# Patient Record
Sex: MALE | Race: WHITE | NOT HISPANIC OR LATINO | Employment: OTHER | URBAN - METROPOLITAN AREA
[De-identification: names, ages, dates, MRNs, and addresses within clinical notes are randomized per-mention and may not be internally consistent; named-entity substitution may affect disease eponyms.]

---

## 2023-09-19 PROBLEM — M51.9 DISORDER OF INTERVERTEBRAL DISC OF THORACIC SPINE: Status: ACTIVE | Noted: 2023-09-19

## 2023-09-19 PROBLEM — F41.9 ANXIETY: Status: ACTIVE | Noted: 2023-09-19

## 2023-09-19 PROBLEM — M47.812 CERVICAL ARTHRITIS: Status: ACTIVE | Noted: 2023-09-19

## 2023-09-19 PROBLEM — M54.12 CERVICAL RADICULOPATHY: Status: ACTIVE | Noted: 2023-09-19

## 2023-09-19 PROBLEM — G47.00 INSOMNIA: Status: ACTIVE | Noted: 2023-09-19

## 2023-09-19 PROBLEM — R22.9 SUBCUTANEOUS MASS: Status: ACTIVE | Noted: 2023-09-19

## 2023-09-19 PROBLEM — F11.20 OPIATE DEPENDENCE (MULTI): Status: ACTIVE | Noted: 2023-09-19

## 2023-09-19 PROBLEM — M77.50 ANKLE ENTHESOPATHY: Status: ACTIVE | Noted: 2023-09-19

## 2023-09-19 PROBLEM — M48.00 SPINAL STENOSIS: Status: ACTIVE | Noted: 2023-09-19

## 2023-09-19 PROBLEM — L40.9 PSORIASIS: Status: ACTIVE | Noted: 2023-09-19

## 2023-09-19 PROBLEM — M77.52 LEFT CALCANEAL BURSITIS: Status: ACTIVE | Noted: 2023-09-19

## 2023-09-19 PROBLEM — E78.5 HYPERLIPIDEMIA: Status: ACTIVE | Noted: 2023-09-19

## 2023-09-19 PROBLEM — Z91.89 AT RISK FOR BLEEDING: Status: ACTIVE | Noted: 2023-09-19

## 2023-09-19 PROBLEM — N40.0 BENIGN PROSTATIC HYPERPLASIA WITHOUT URINARY OBSTRUCTION: Status: ACTIVE | Noted: 2023-09-19

## 2023-09-19 PROBLEM — R63.5 ABNORMAL WEIGHT GAIN: Status: ACTIVE | Noted: 2023-09-19

## 2023-09-19 PROBLEM — S88.119A AMPUTATED BELOW KNEE (MULTI): Status: ACTIVE | Noted: 2023-09-19

## 2023-09-19 PROBLEM — E11.40 PAINFUL DIABETIC NEUROPATHY (MULTI): Status: ACTIVE | Noted: 2023-09-19

## 2023-09-19 PROBLEM — M67.972 ACHILLES TENDON DISORDER, LEFT: Status: ACTIVE | Noted: 2023-09-19

## 2023-09-19 PROBLEM — I74.3: Status: ACTIVE | Noted: 2023-09-19

## 2023-09-19 PROBLEM — M62.838 MUSCLE SPASM: Status: ACTIVE | Noted: 2023-09-19

## 2023-09-19 PROBLEM — J45.990 ASTHMA, EXERCISE INDUCED (HHS-HCC): Status: ACTIVE | Noted: 2023-09-19

## 2023-09-19 PROBLEM — Z89.9: Status: ACTIVE | Noted: 2023-09-19

## 2023-09-19 PROBLEM — G56.20 ULNAR NEUROPATHY AT ELBOW: Status: ACTIVE | Noted: 2023-09-19

## 2023-09-19 PROBLEM — M51.24 THORACIC DISC HERNIATION: Status: ACTIVE | Noted: 2023-09-19

## 2023-09-19 PROBLEM — L85.9 EPIDERMAL THICKENING, UNSPECIFIED: Status: ACTIVE | Noted: 2017-05-17

## 2023-09-19 PROBLEM — N52.9 ERECTILE DYSFUNCTION: Status: ACTIVE | Noted: 2023-09-19

## 2023-09-19 PROBLEM — R93.89 ABNORMAL FINDING ON IMAGING: Status: ACTIVE | Noted: 2023-09-19

## 2023-09-19 PROBLEM — R00.2 HEART PALPITATIONS: Status: ACTIVE | Noted: 2023-09-19

## 2023-09-19 PROBLEM — G47.33 OBSTRUCTIVE SLEEP APNEA SYNDROME: Status: ACTIVE | Noted: 2023-09-19

## 2023-09-19 PROBLEM — K04.7 DENTAL INFECTION: Status: ACTIVE | Noted: 2023-09-19

## 2023-09-19 PROBLEM — M96.1 CERVICAL POSTLAMINECTOMY SYNDROME: Status: ACTIVE | Noted: 2023-09-19

## 2023-09-19 PROBLEM — D17.30 LIPOMA OF SKIN AND SUBCUTANEOUS TISSUE: Status: ACTIVE | Noted: 2023-09-19

## 2023-09-19 PROBLEM — G89.4 CHRONIC PAIN SYNDROME: Status: ACTIVE | Noted: 2023-09-19

## 2023-09-19 PROBLEM — G56.01 RIGHT CARPAL TUNNEL SYNDROME: Status: ACTIVE | Noted: 2023-09-19

## 2023-09-19 PROBLEM — I96 GANGRENE OF FOOT (MULTI): Status: ACTIVE | Noted: 2023-09-19

## 2023-09-19 PROBLEM — F32.9 MAJOR DEPRESSIVE DISORDER, SINGLE EPISODE, UNSPECIFIED: Status: ACTIVE | Noted: 2023-09-19

## 2023-09-19 PROBLEM — I10 HYPERTENSION: Status: ACTIVE | Noted: 2023-09-19

## 2023-09-19 PROBLEM — R79.9 ABNORMAL BLOOD CHEMISTRY: Status: ACTIVE | Noted: 2023-09-19

## 2023-09-19 PROBLEM — M19.90 ARTHRITIS: Status: ACTIVE | Noted: 2023-09-19

## 2023-09-19 PROBLEM — R60.9 EDEMA: Status: ACTIVE | Noted: 2023-09-19

## 2023-09-19 PROBLEM — E11.9 DIABETES MELLITUS (MULTI): Status: ACTIVE | Noted: 2023-09-19

## 2023-09-19 PROBLEM — K21.00 GASTROESOPHAGEAL REFLUX DISEASE WITH ESOPHAGITIS: Status: ACTIVE | Noted: 2023-09-19

## 2023-09-19 PROBLEM — M54.14 THORACIC RADICULOPATHY: Status: ACTIVE | Noted: 2023-09-19

## 2023-09-19 PROBLEM — G45.9 TRANSIENT ISCHEMIC ATTACK: Status: ACTIVE | Noted: 2023-09-19

## 2023-09-19 PROBLEM — I87.2 CHRONIC VENOUS INSUFFICIENCY: Status: ACTIVE | Noted: 2017-05-17

## 2023-09-19 PROBLEM — M43.22 FUSION OF SPINE OF CERVICAL REGION: Status: ACTIVE | Noted: 2023-09-19

## 2023-09-19 PROBLEM — F44.9 DISSOCIATIVE DISORDER: Status: ACTIVE | Noted: 2023-09-19

## 2023-09-19 PROBLEM — M25.839 ULNAR IMPINGEMENT SYNDROME: Status: ACTIVE | Noted: 2023-09-19

## 2023-09-19 PROBLEM — R06.89 IMPAIRED GAS EXCHANGE: Status: ACTIVE | Noted: 2023-09-19

## 2023-09-19 PROBLEM — D48.2: Status: ACTIVE | Noted: 2023-09-19

## 2023-09-19 PROBLEM — M47.817 SPONDYLOSIS WITHOUT MYELOPATHY OR RADICULOPATHY, LUMBOSACRAL REGION: Status: ACTIVE | Noted: 2023-09-19

## 2023-09-19 PROBLEM — I82.90 VENOUS THROMBOSIS: Status: ACTIVE | Noted: 2023-09-19

## 2023-09-19 PROBLEM — B35.4 TINEA CORPORIS: Status: ACTIVE | Noted: 2023-09-19

## 2023-09-19 PROBLEM — K59.09 CHRONIC CONSTIPATION: Status: ACTIVE | Noted: 2023-09-19

## 2023-09-19 PROBLEM — M25.562 LEFT KNEE PAIN: Status: ACTIVE | Noted: 2023-09-19

## 2023-09-19 PROBLEM — I73.9 PERIPHERAL VASCULAR DISEASE (CMS-HCC): Status: ACTIVE | Noted: 2023-09-19

## 2023-09-19 PROBLEM — R13.10 DYSPHAGIA, UNSPECIFIED: Status: ACTIVE | Noted: 2023-09-19

## 2023-09-19 RX ORDER — TRIAMCINOLONE ACETONIDE 1 MG/G
1 CREAM TOPICAL
COMMUNITY
Start: 2017-05-17 | End: 2024-02-01

## 2023-09-19 RX ORDER — INSULIN LISPRO 100 [IU]/ML
INJECTION, SOLUTION INTRAVENOUS; SUBCUTANEOUS
COMMUNITY
End: 2024-02-01

## 2023-09-19 RX ORDER — DICYCLOMINE HYDROCHLORIDE 10 MG/1
1 CAPSULE ORAL 3 TIMES DAILY PRN
COMMUNITY
End: 2024-02-01

## 2023-09-19 RX ORDER — OXYCODONE HYDROCHLORIDE 5 MG/1
TABLET ORAL
COMMUNITY
Start: 2018-11-16 | End: 2023-10-03 | Stop reason: ALTCHOICE

## 2023-09-19 RX ORDER — INSULIN DETEMIR 100 [IU]/ML
60 INJECTION, SOLUTION SUBCUTANEOUS
COMMUNITY
Start: 2018-03-21 | End: 2024-02-01

## 2023-09-19 RX ORDER — ALPRAZOLAM 1 MG/1
1 TABLET ORAL EVERY 8 HOURS PRN
COMMUNITY
Start: 2023-05-05 | End: 2024-01-02 | Stop reason: SDUPTHER

## 2023-09-19 RX ORDER — DICLOFENAC SODIUM 10 MG/G
GEL TOPICAL
COMMUNITY
Start: 2020-01-09 | End: 2024-02-01 | Stop reason: SDUPTHER

## 2023-09-19 RX ORDER — TAMSULOSIN HYDROCHLORIDE 0.4 MG/1
CAPSULE ORAL
COMMUNITY
Start: 2020-09-24 | End: 2024-02-01

## 2023-09-19 RX ORDER — DICLOFENAC SODIUM 30 MG/G
GEL TOPICAL
COMMUNITY
Start: 2016-08-08 | End: 2024-02-01

## 2023-09-19 RX ORDER — ALPRAZOLAM 0.5 MG/1
2 TABLET ORAL 3 TIMES DAILY
COMMUNITY
End: 2024-02-01 | Stop reason: WASHOUT

## 2023-09-19 RX ORDER — KETOCONAZOLE 20 MG/ML
1 SHAMPOO, SUSPENSION TOPICAL
COMMUNITY
Start: 2017-05-17 | End: 2024-02-01

## 2023-09-19 RX ORDER — AMMONIUM LACTATE 12 G/100G
1 LOTION TOPICAL 2 TIMES DAILY
COMMUNITY
Start: 2019-01-17 | End: 2024-02-01 | Stop reason: WASHOUT

## 2023-09-19 RX ORDER — HYDROCHLOROTHIAZIDE 25 MG/1
1 TABLET ORAL DAILY
COMMUNITY
Start: 2017-10-14 | End: 2024-02-01

## 2023-09-19 RX ORDER — OMEPRAZOLE 20 MG/1
1 CAPSULE, DELAYED RELEASE ORAL 2 TIMES DAILY
COMMUNITY
Start: 2017-09-27 | End: 2024-02-01

## 2023-09-19 RX ORDER — POLYETHYLENE GLYCOL 3350 17 G/17G
POWDER, FOR SOLUTION ORAL DAILY
COMMUNITY
Start: 2012-07-11 | End: 2024-02-01

## 2023-09-19 RX ORDER — INSULIN ASPART 100 [IU]/ML
20 INJECTION, SOLUTION INTRAVENOUS; SUBCUTANEOUS 3 TIMES DAILY
COMMUNITY
Start: 2017-06-09 | End: 2024-02-29 | Stop reason: WASHOUT

## 2023-09-19 RX ORDER — FLUTICASONE PROPIONATE 50 MCG
1 SPRAY, SUSPENSION (ML) NASAL DAILY
COMMUNITY
Start: 2013-10-18 | End: 2024-05-13

## 2023-09-19 RX ORDER — CLOTRIMAZOLE AND BETAMETHASONE DIPROPIONATE 10; .64 MG/G; MG/G
CREAM TOPICAL 2 TIMES DAILY
COMMUNITY
Start: 2017-06-26 | End: 2024-02-01

## 2023-09-19 RX ORDER — OXYCODONE HCL 10 MG/1
TABLET, FILM COATED, EXTENDED RELEASE ORAL
COMMUNITY
Start: 2018-11-01 | End: 2023-10-03 | Stop reason: ALTCHOICE

## 2023-09-19 RX ORDER — AMITRIPTYLINE HYDROCHLORIDE 50 MG/1
1 TABLET, FILM COATED ORAL DAILY
COMMUNITY
Start: 2017-04-28 | End: 2023-12-12

## 2023-09-19 RX ORDER — BLOOD SUGAR DIAGNOSTIC
STRIP MISCELLANEOUS
COMMUNITY
Start: 2013-12-10

## 2023-09-19 RX ORDER — VILAZODONE HYDROCHLORIDE 20 MG/1
1 TABLET ORAL DAILY
COMMUNITY
End: 2024-02-01

## 2023-09-19 RX ORDER — SUCRALFATE 1 G/1
1 TABLET ORAL 4 TIMES DAILY
COMMUNITY
Start: 2013-05-29 | End: 2024-02-01

## 2023-09-19 RX ORDER — TIZANIDINE 4 MG/1
1 TABLET ORAL EVERY 4 HOURS PRN
COMMUNITY
Start: 2017-11-13 | End: 2024-03-11

## 2023-09-19 RX ORDER — LISINOPRIL 10 MG/1
1 TABLET ORAL DAILY
COMMUNITY
Start: 2017-12-26 | End: 2024-02-01

## 2023-09-19 RX ORDER — FENOFIBRATE 160 MG/1
1 TABLET ORAL DAILY
COMMUNITY
Start: 2017-11-02 | End: 2024-02-01

## 2023-09-19 RX ORDER — NAPROXEN SODIUM 220 MG/1
1 TABLET, FILM COATED ORAL DAILY
COMMUNITY
Start: 2021-09-01

## 2023-09-19 RX ORDER — PANTOPRAZOLE SODIUM 40 MG/1
1 TABLET, DELAYED RELEASE ORAL DAILY
COMMUNITY
End: 2024-02-01

## 2023-09-19 RX ORDER — TALC
1 POWDER (GRAM) TOPICAL NIGHTLY
COMMUNITY
End: 2024-02-01

## 2023-09-19 RX ORDER — METFORMIN HYDROCHLORIDE 1000 MG/1
1 TABLET ORAL
COMMUNITY
Start: 2017-10-08 | End: 2024-06-10

## 2023-09-19 RX ORDER — PEN NEEDLE, DIABETIC 29 G X1/2"
NEEDLE, DISPOSABLE MISCELLANEOUS EVERY 6 HOURS
COMMUNITY
Start: 2019-02-11

## 2023-09-19 RX ORDER — INSULIN GLARGINE 100 [IU]/ML
60 INJECTION, SOLUTION SUBCUTANEOUS DAILY
COMMUNITY
Start: 2023-02-03 | End: 2023-11-06 | Stop reason: ALTCHOICE

## 2023-09-19 RX ORDER — INSULIN GLARGINE 100 [IU]/ML
40 INJECTION, SOLUTION SUBCUTANEOUS EVERY MORNING
COMMUNITY
End: 2023-11-06 | Stop reason: ALTCHOICE

## 2023-09-19 RX ORDER — METFORMIN HYDROCHLORIDE 500 MG/1
1 TABLET ORAL
COMMUNITY
End: 2024-02-01

## 2023-09-19 RX ORDER — KETOCONAZOLE 20 MG/G
CREAM TOPICAL 2 TIMES DAILY
COMMUNITY
Start: 2015-03-26 | End: 2024-02-01

## 2023-09-19 RX ORDER — ALBUTEROL SULFATE 90 UG/1
1 AEROSOL, METERED RESPIRATORY (INHALATION) EVERY 4 HOURS PRN
COMMUNITY
Start: 2021-09-30 | End: 2024-02-01

## 2023-09-19 RX ORDER — OXYCODONE HCL 40 MG/1
1 TABLET, FILM COATED, EXTENDED RELEASE ORAL 2 TIMES DAILY
COMMUNITY
End: 2023-10-03 | Stop reason: ALTCHOICE

## 2023-09-19 RX ORDER — LIDOCAINE 50 MG/G
1 PATCH TOPICAL DAILY
COMMUNITY
Start: 2021-10-28

## 2023-09-19 RX ORDER — DOCUSATE SODIUM 100 MG/1
1 CAPSULE, LIQUID FILLED ORAL 2 TIMES DAILY
COMMUNITY
Start: 2021-01-29 | End: 2024-02-01

## 2023-09-19 RX ORDER — GABAPENTIN 300 MG/1
2 CAPSULE ORAL 3 TIMES DAILY
COMMUNITY
Start: 2017-10-14

## 2023-09-19 RX ORDER — OMEPRAZOLE 40 MG/1
1 CAPSULE, DELAYED RELEASE ORAL DAILY
COMMUNITY
End: 2024-02-01

## 2023-09-19 RX ORDER — TRAZODONE HYDROCHLORIDE 100 MG/1
1 TABLET ORAL NIGHTLY
COMMUNITY
Start: 2013-06-21 | End: 2023-12-12

## 2023-09-19 RX ORDER — POTASSIUM CHLORIDE 750 MG/1
1 TABLET, FILM COATED, EXTENDED RELEASE ORAL DAILY
COMMUNITY
Start: 2021-02-28 | End: 2024-02-01

## 2023-09-19 RX ORDER — LIDOCAINE 50 MG/G
OINTMENT TOPICAL
COMMUNITY
Start: 2017-03-01 | End: 2024-02-01

## 2023-10-03 ENCOUNTER — OFFICE VISIT (OUTPATIENT)
Dept: PAIN MEDICINE | Facility: CLINIC | Age: 53
End: 2023-10-03
Payer: MEDICARE

## 2023-10-03 VITALS
DIASTOLIC BLOOD PRESSURE: 90 MMHG | HEIGHT: 74 IN | WEIGHT: 220 LBS | BODY MASS INDEX: 28.23 KG/M2 | SYSTOLIC BLOOD PRESSURE: 142 MMHG | RESPIRATION RATE: 16 BRPM

## 2023-10-03 DIAGNOSIS — G89.29 OTHER CHRONIC PAIN: ICD-10-CM

## 2023-10-03 DIAGNOSIS — M54.12 CERVICAL RADICULOPATHY: ICD-10-CM

## 2023-10-03 DIAGNOSIS — M96.1 POSTLAMINECTOMY SYNDROME, CERVICAL REGION: Primary | ICD-10-CM

## 2023-10-03 DIAGNOSIS — E11.40 DIABETIC NEUROPATHY, PAINFUL (MULTI): ICD-10-CM

## 2023-10-03 PROCEDURE — 99214 OFFICE O/P EST MOD 30 MIN: CPT | Performed by: ANESTHESIOLOGY

## 2023-10-03 PROCEDURE — 3080F DIAST BP >= 90 MM HG: CPT | Performed by: ANESTHESIOLOGY

## 2023-10-03 PROCEDURE — 4010F ACE/ARB THERAPY RXD/TAKEN: CPT | Performed by: ANESTHESIOLOGY

## 2023-10-03 PROCEDURE — 1036F TOBACCO NON-USER: CPT | Performed by: ANESTHESIOLOGY

## 2023-10-03 PROCEDURE — 3077F SYST BP >= 140 MM HG: CPT | Performed by: ANESTHESIOLOGY

## 2023-10-03 RX ORDER — OXYCODONE AND ACETAMINOPHEN 10; 325 MG/1; MG/1
1 TABLET ORAL EVERY 6 HOURS PRN
Qty: 120 TABLET | Refills: 0 | Status: SHIPPED | OUTPATIENT
Start: 2023-11-05 | End: 2023-11-10 | Stop reason: SDUPTHER

## 2023-10-03 RX ORDER — OXYCODONE AND ACETAMINOPHEN 10; 325 MG/1; MG/1
1 TABLET ORAL EVERY 6 HOURS PRN
COMMUNITY
End: 2023-10-03 | Stop reason: SDUPTHER

## 2023-10-03 RX ORDER — OXYCODONE AND ACETAMINOPHEN 10; 325 MG/1; MG/1
1 TABLET ORAL EVERY 6 HOURS PRN
Qty: 120 TABLET | Refills: 0 | Status: SHIPPED | OUTPATIENT
Start: 2023-10-06 | End: 2023-10-03 | Stop reason: SDUPTHER

## 2023-10-03 RX ORDER — NALOXONE HYDROCHLORIDE 4 MG/.1ML
4 SPRAY NASAL AS NEEDED
Qty: 2 EACH | Refills: 0 | Status: SHIPPED | OUTPATIENT
Start: 2023-10-03 | End: 2024-02-01

## 2023-10-03 ASSESSMENT — PATIENT HEALTH QUESTIONNAIRE - PHQ9
2. FEELING DOWN, DEPRESSED OR HOPELESS: NOT AT ALL
SUM OF ALL RESPONSES TO PHQ9 QUESTIONS 1 AND 2: 0
1. LITTLE INTEREST OR PLEASURE IN DOING THINGS: NOT AT ALL

## 2023-10-03 ASSESSMENT — ENCOUNTER SYMPTOMS
RESPIRATORY NEGATIVE: 1
GASTROINTESTINAL NEGATIVE: 1
ALLERGIC/IMMUNOLOGIC NEGATIVE: 1
PSYCHIATRIC NEGATIVE: 1
WEAKNESS: 1
NECK PAIN: 1
CARDIOVASCULAR NEGATIVE: 1
NUMBNESS: 1
EYES NEGATIVE: 1
HEMATOLOGIC/LYMPHATIC NEGATIVE: 1
ENDOCRINE NEGATIVE: 1
NECK STIFFNESS: 1
CONSTITUTIONAL NEGATIVE: 1
BACK PAIN: 1

## 2023-10-03 ASSESSMENT — PAIN SCALES - GENERAL: PAINLEVEL_OUTOF10: 9

## 2023-10-03 ASSESSMENT — PAIN - FUNCTIONAL ASSESSMENT: PAIN_FUNCTIONAL_ASSESSMENT: 0-10

## 2023-10-03 NOTE — PROGRESS NOTES
MEDICATION NAME: percocet  STRENGTH: 10/325mg  LAST FILL DATE: 23  DATE LAST TAKEN: 10/3/23  QUANTITY FILLED: 120  QUANTITY REMAIN  COUNT COMPLETED BY: СЕРГЕЙ NORTON RN        Modified Osw[employer] disability form filled out annually.

## 2023-10-03 NOTE — PROGRESS NOTES
Subjective   Patient ID: Geoffrey Dan is a 52 y.o. male who presents for neck, left leg, low back.  HPI  Patient here today for management of his chronic neck and arm pain and left foot pain..  He had a previous neck fusion surgery in the past and has had continued neck and arm pain.  Over the last 6 months his pain has been elevated and has had increased numbness down both arms.  He has intermittent weakness in both arms as well. He has not had a new MRI in > 2 years.  He has an appt with his neurologist and was hoping to get a new MRI.  His pain spikes and gets so severe his BG is hard to control for him.  He wakes up in the middle of the night with severe pain that he doesn't know what to do.  He uses heat and ice and stretching and his medications.  When he takes his medications they are effective but he gets severe pain with increased activity.  He is unable to exercises.    Review of Systems   Constitutional: Negative.    HENT: Negative.     Eyes: Negative.    Respiratory: Negative.     Cardiovascular: Negative.    Gastrointestinal: Negative.    Endocrine: Negative.    Genitourinary: Negative.    Musculoskeletal:  Positive for back pain, neck pain and neck stiffness.   Skin: Negative.    Allergic/Immunologic: Negative.    Neurological:  Positive for weakness and numbness.   Hematological: Negative.    Psychiatric/Behavioral: Negative.         Objective   Physical Exam  Vitals and nursing note reviewed.   Constitutional:       Appearance: Normal appearance.   HENT:      Head: Normocephalic and atraumatic.      Right Ear: Ear canal and external ear normal.      Left Ear: Ear canal and external ear normal.      Nose: Nose normal.      Mouth/Throat:      Mouth: Mucous membranes are moist.      Pharynx: Oropharynx is clear.   Eyes:      Conjunctiva/sclera: Conjunctivae normal.      Pupils: Pupils are equal, round, and reactive to light.   Cardiovascular:      Rate and Rhythm: Normal rate.   Pulmonary:      Effort:  Pulmonary effort is normal. No respiratory distress.   Musculoskeletal:      Cervical back: Neck supple. Spasms and tenderness present. Pain with movement present. Decreased range of motion.      Lumbar back: Tenderness present. Normal range of motion.      Left foot: Decreased range of motion.   Skin:     General: Skin is warm and dry.   Neurological:      Mental Status: He is alert.      Cranial Nerves: Cranial nerves 2-12 are intact.      Sensory: Sensory deficit present.      Motor: Weakness (hand) present.      Coordination: Coordination is intact.      Gait: Gait is intact.      Deep Tendon Reflexes:      Reflex Scores:       Bicep reflexes are 1+ on the right side and 1+ on the left side.       Brachioradialis reflexes are 1+ on the right side and 1+ on the left side.  Psychiatric:         Mood and Affect: Mood normal.         Thought Content: Thought content normal.       Assessment/Plan   Problem List Items Addressed This Visit             ICD-10-CM       Neuro    Cervical radiculopathy M54.12     Other Visit Diagnoses         Codes    Postlaminectomy syndrome, cervical region    -  Primary M96.1    Diabetic neuropathy, painful (CMS/[facility])     E11.40    Other chronic pain     G89.29          I nice discussion with the patient today our plan will be as follows.    Radiology: Patient to have a new cervical MRI.    Physically:  Patient to continue with home exercise program.     Psychologically:  No issues at this time.     Medication: I will refill the patient's opioids today for 2 month.  The patient continues to see benefit and improvement in their quality of life and ability to maintain ADLs.  Patient educated about the risks of taking opioids and operating a motor vehicle.  Patient reports no adverse side effects to current medication regimen.  Current regimen does allow patient to maintain ADLs.  Patient reports no new neurologic symptoms, new pain areas, or exacerbation in pain today.  Patient reports  they are happy with current treatment care path.    [place]AR[place] was reviewed and was consistent with the history.    Patient has been educated on the risks, benefits, and alternatives of controlled substances as well as the proper way to store these medications.  The patient and I discussed the nature of this medication and its side effects.  We discussed tolerance, physical dependence, psychological dependence, addiction and opioid-induced hyperalgesia.  We discussed the potential need to wean from this medication.  We discussed the availability of programs that can help with this process if necessary.  We discussed safety issues related to opioids including safe storage.  We discussed the fact that the patient should not drive an automobile or operate heavy machinery while taking this medication.  A prescription for naloxone was offered to the patient.  The patient will be re-evaluated for the need to continue opioid therapy in 60-90 days.  Pill Count - Appropriate  UDS completed at previous OV was compliant      Duration:  > 2 years    Intervention:   We had an initial discussion about SCS today.  We will continue to discuss.

## 2023-10-23 ENCOUNTER — HOSPITAL ENCOUNTER (OUTPATIENT)
Dept: RADIOLOGY | Facility: HOSPITAL | Age: 53
Discharge: HOME | End: 2023-10-23
Payer: MEDICARE

## 2023-10-23 DIAGNOSIS — M54.12 CERVICAL RADICULOPATHY: ICD-10-CM

## 2023-10-23 DIAGNOSIS — M96.1 POSTLAMINECTOMY SYNDROME, CERVICAL REGION: ICD-10-CM

## 2023-10-23 PROCEDURE — 72141 MRI NECK SPINE W/O DYE: CPT | Mod: ME

## 2023-10-23 PROCEDURE — 72141 MRI NECK SPINE W/O DYE: CPT | Performed by: RADIOLOGY

## 2023-11-06 ENCOUNTER — TELEPHONE (OUTPATIENT)
Dept: PRIMARY CARE | Facility: CLINIC | Age: 53
End: 2023-11-06
Payer: MEDICARE

## 2023-11-06 DIAGNOSIS — E11.40 TYPE 2 DIABETES MELLITUS WITH DIABETIC NEUROPATHY, WITH LONG-TERM CURRENT USE OF INSULIN (MULTI): Primary | ICD-10-CM

## 2023-11-06 DIAGNOSIS — M96.1 POSTLAMINECTOMY SYNDROME, CERVICAL REGION: ICD-10-CM

## 2023-11-06 DIAGNOSIS — Z79.4 TYPE 2 DIABETES MELLITUS WITH DIABETIC NEUROPATHY, WITH LONG-TERM CURRENT USE OF INSULIN (MULTI): Primary | ICD-10-CM

## 2023-11-06 DIAGNOSIS — G89.29 OTHER CHRONIC PAIN: ICD-10-CM

## 2023-11-06 RX ORDER — INSULIN GLARGINE 100 [IU]/ML
60 INJECTION, SOLUTION SUBCUTANEOUS EVERY MORNING
Qty: 45 ML | Refills: 3 | Status: SHIPPED | OUTPATIENT
Start: 2023-11-06

## 2023-11-06 NOTE — TELEPHONE ENCOUNTER
PATIENT PICKED UP SCRIPT YESTERDAY 11/5 AND PHARMACY ONLY HAD 67 PILLS. NEEDS NEW SCRIPT SENT FOR REMAINING 53 PILLS.

## 2023-11-07 ENCOUNTER — PHARMACY VISIT (OUTPATIENT)
Dept: PHARMACY | Facility: CLINIC | Age: 53
End: 2023-11-07
Payer: MEDICARE

## 2023-11-07 PROCEDURE — RXMED WILLOW AMBULATORY MEDICATION CHARGE

## 2023-11-07 RX ORDER — OXYCODONE AND ACETAMINOPHEN 10; 325 MG/1; MG/1
1 TABLET ORAL EVERY 6 HOURS PRN
Qty: 120 TABLET | Refills: 0 | OUTPATIENT
Start: 2023-11-07 | End: 2023-12-07

## 2023-11-10 ENCOUNTER — TELEPHONE (OUTPATIENT)
Dept: PAIN MEDICINE | Facility: CLINIC | Age: 53
End: 2023-11-10
Payer: MEDICARE

## 2023-11-10 DIAGNOSIS — G89.29 OTHER CHRONIC PAIN: ICD-10-CM

## 2023-11-10 DIAGNOSIS — M96.1 POSTLAMINECTOMY SYNDROME, CERVICAL REGION: ICD-10-CM

## 2023-11-13 RX ORDER — OXYCODONE AND ACETAMINOPHEN 10; 325 MG/1; MG/1
1 TABLET ORAL EVERY 6 HOURS PRN
Qty: 56 TABLET | Refills: 0 | Status: SHIPPED | OUTPATIENT
Start: 2023-11-20 | End: 2023-12-04 | Stop reason: SDUPTHER

## 2023-11-20 ENCOUNTER — PHARMACY VISIT (OUTPATIENT)
Dept: PHARMACY | Facility: CLINIC | Age: 53
End: 2023-11-20
Payer: MEDICARE

## 2023-11-20 PROCEDURE — RXMED WILLOW AMBULATORY MEDICATION CHARGE

## 2023-11-21 ENCOUNTER — TELEPHONE (OUTPATIENT)
Dept: PAIN MEDICINE | Facility: CLINIC | Age: 53
End: 2023-11-21
Payer: MEDICARE

## 2023-11-21 NOTE — TELEPHONE ENCOUNTER
PT CALLED 3 TIMES TODAY WITH THE SAME REQUEST  TO HAVE HIS PERCOCET SCRIPT REFILLED.  I CALLED HIM TWICE TO LET HIM KNOW HIS REFILL REQUEST HAD BEEN CALLED IN AND HE COULD  HIS MEDS.

## 2023-12-01 ENCOUNTER — TELEPHONE (OUTPATIENT)
Dept: PAIN MEDICINE | Facility: CLINIC | Age: 53
End: 2023-12-01
Payer: MEDICARE

## 2023-12-01 DIAGNOSIS — M96.1 POSTLAMINECTOMY SYNDROME, CERVICAL REGION: ICD-10-CM

## 2023-12-01 DIAGNOSIS — G89.29 OTHER CHRONIC PAIN: ICD-10-CM

## 2023-12-01 NOTE — TELEPHONE ENCOUNTER
Pt called requesting if he can have med on 12/5 bec the script that you wrote dated 11/20 is for 14 days (12/4) and his appt is on 12/5 at 3:45pm and he would not have any medication that day. Please advise

## 2023-12-04 ENCOUNTER — PHARMACY VISIT (OUTPATIENT)
Dept: PHARMACY | Facility: CLINIC | Age: 53
End: 2023-12-04
Payer: MEDICARE

## 2023-12-04 PROCEDURE — RXMED WILLOW AMBULATORY MEDICATION CHARGE

## 2023-12-04 RX ORDER — OXYCODONE AND ACETAMINOPHEN 10; 325 MG/1; MG/1
1 TABLET ORAL EVERY 6 HOURS PRN
Qty: 120 TABLET | Refills: 0 | Status: SHIPPED | OUTPATIENT
Start: 2023-12-04 | End: 2023-12-05 | Stop reason: SDUPTHER

## 2023-12-05 ENCOUNTER — APPOINTMENT (OUTPATIENT)
Dept: PAIN MEDICINE | Facility: CLINIC | Age: 53
End: 2023-12-05
Payer: MEDICARE

## 2023-12-05 ENCOUNTER — OFFICE VISIT (OUTPATIENT)
Dept: PAIN MEDICINE | Facility: CLINIC | Age: 53
End: 2023-12-05
Payer: MEDICARE

## 2023-12-05 VITALS
WEIGHT: 220 LBS | SYSTOLIC BLOOD PRESSURE: 158 MMHG | HEIGHT: 74 IN | DIASTOLIC BLOOD PRESSURE: 94 MMHG | BODY MASS INDEX: 28.23 KG/M2 | RESPIRATION RATE: 16 BRPM

## 2023-12-05 DIAGNOSIS — M96.1 POSTLAMINECTOMY SYNDROME, CERVICAL REGION: ICD-10-CM

## 2023-12-05 DIAGNOSIS — M54.12 CERVICAL RADICULOPATHY: ICD-10-CM

## 2023-12-05 DIAGNOSIS — G89.4 CHRONIC PAIN SYNDROME: ICD-10-CM

## 2023-12-05 DIAGNOSIS — G89.29 OTHER CHRONIC PAIN: ICD-10-CM

## 2023-12-05 DIAGNOSIS — M96.1 CERVICAL POSTLAMINECTOMY SYNDROME: Primary | ICD-10-CM

## 2023-12-05 PROBLEM — F31.10 BIPOLAR AFFECTIVE DISORDER, CURRENT EPISODE MANIC (MULTI): Chronic | Status: ACTIVE | Noted: 2022-04-21

## 2023-12-05 PROBLEM — F41.1 GENERALIZED ANXIETY DISORDER: Chronic | Status: ACTIVE | Noted: 2021-09-23

## 2023-12-05 PROBLEM — F41.0 PANIC DISORDER WITHOUT AGORAPHOBIA: Status: ACTIVE | Noted: 2022-04-21

## 2023-12-05 PROBLEM — F43.10 POSTTRAUMATIC STRESS DISORDER: Chronic | Status: ACTIVE | Noted: 2021-09-23

## 2023-12-05 PROCEDURE — 1036F TOBACCO NON-USER: CPT | Performed by: ANESTHESIOLOGY

## 2023-12-05 PROCEDURE — 3077F SYST BP >= 140 MM HG: CPT | Performed by: ANESTHESIOLOGY

## 2023-12-05 PROCEDURE — 99214 OFFICE O/P EST MOD 30 MIN: CPT | Performed by: ANESTHESIOLOGY

## 2023-12-05 PROCEDURE — 4010F ACE/ARB THERAPY RXD/TAKEN: CPT | Performed by: ANESTHESIOLOGY

## 2023-12-05 PROCEDURE — 3080F DIAST BP >= 90 MM HG: CPT | Performed by: ANESTHESIOLOGY

## 2023-12-05 RX ORDER — OXYCODONE AND ACETAMINOPHEN 10; 325 MG/1; MG/1
1 TABLET ORAL EVERY 6 HOURS PRN
Qty: 120 TABLET | Refills: 0 | Status: SHIPPED | OUTPATIENT
Start: 2024-01-03 | End: 2024-02-01 | Stop reason: SDUPTHER

## 2023-12-05 ASSESSMENT — ENCOUNTER SYMPTOMS
CARDIOVASCULAR NEGATIVE: 1
EYES NEGATIVE: 1
NECK STIFFNESS: 1
ALLERGIC/IMMUNOLOGIC NEGATIVE: 1
PSYCHIATRIC NEGATIVE: 1
RESPIRATORY NEGATIVE: 1
BACK PAIN: 1
NUMBNESS: 1
WEAKNESS: 1
HEMATOLOGIC/LYMPHATIC NEGATIVE: 1
GASTROINTESTINAL NEGATIVE: 1
ENDOCRINE NEGATIVE: 1
CONSTITUTIONAL NEGATIVE: 1
NECK PAIN: 1

## 2023-12-05 ASSESSMENT — PAIN SCALES - GENERAL
PAINLEVEL_OUTOF10: 9
PAINLEVEL: 9

## 2023-12-05 ASSESSMENT — LIFESTYLE VARIABLES: TOTAL SCORE: 3

## 2023-12-05 ASSESSMENT — PAIN - FUNCTIONAL ASSESSMENT: PAIN_FUNCTIONAL_ASSESSMENT: 0-10

## 2023-12-05 NOTE — PROGRESS NOTES
Subjective   Patient ID: Geoffrey Dan is a 53 y.o. male who presents for Neck Pain and left leg pain.  Neck Pain   Associated symptoms include numbness and weakness.   Patient here today for a new patient evaluation of his neck and arm pain.  He states that his has had issues in his left arm.  He has decreased ROM in his arm.  He gets numbness down the arms and severe pain.  He had a new MRI completed in October.  He reports that his pain continues to get worse and worse and worse he does not understand why.  He states he suffers with a lot of swelling all the time and he wishes to get his inflammation under control.  He has been offered a third surgery in the past which would be a posterior cervical fusion but this makes him very nervous.  He feels that overall his condition is getting worse the numbness and weakness in his arms is getting worse.  He does have follow-up appointment with his neurologist soon with his new MRI.    The patient's current medication regimen continues to be effective for them without any adverse side effects.  The patient is able to complete all ADLs independently.  The patient reports no new symptoms today including numbness, tingling, weakness.  The patient reports no new neurological deficits or any new musculoskeletal issues.      Review of Systems   Constitutional: Negative.    HENT: Negative.     Eyes: Negative.    Respiratory: Negative.     Cardiovascular: Negative.    Gastrointestinal: Negative.    Endocrine: Negative.    Genitourinary: Negative.    Musculoskeletal:  Positive for back pain, neck pain and neck stiffness.   Skin: Negative.    Allergic/Immunologic: Negative.    Neurological:  Positive for weakness and numbness.   Hematological: Negative.    Psychiatric/Behavioral: Negative.         Objective   Physical Exam  Vitals and nursing note reviewed.   Constitutional:       Appearance: Normal appearance.   HENT:      Head: Normocephalic and atraumatic.      Right Ear: Ear  canal and external ear normal.      Left Ear: Ear canal and external ear normal.      Nose: Nose normal.      Mouth/Throat:      Mouth: Mucous membranes are moist.      Pharynx: Oropharynx is clear.   Eyes:      Conjunctiva/sclera: Conjunctivae normal.      Pupils: Pupils are equal, round, and reactive to light.   Cardiovascular:      Rate and Rhythm: Normal rate.   Pulmonary:      Effort: Pulmonary effort is normal. No respiratory distress.   Musculoskeletal:      Cervical back: Neck supple. Spasms and tenderness present. Pain with movement present. Decreased range of motion.      Lumbar back: Tenderness present. Normal range of motion.      Left foot: Decreased range of motion.   Skin:     General: Skin is warm and dry.   Neurological:      Mental Status: He is alert.      Cranial Nerves: Cranial nerves 2-12 are intact.      Sensory: Sensory deficit present.      Motor: Weakness (hand) present.      Coordination: Coordination is intact.      Gait: Gait is intact.      Deep Tendon Reflexes:      Reflex Scores:       Bicep reflexes are 1+ on the right side and 1+ on the left side.       Brachioradialis reflexes are 1+ on the right side and 1+ on the left side.  Psychiatric:         Mood and Affect: Mood normal.         Thought Content: Thought content normal.         Assessment/Plan   Problem List Items Addressed This Visit             ICD-10-CM       Musculoskeletal and Injuries    Cervical postlaminectomy syndrome - Primary M96.1       Neuro    Chronic pain syndrome G89.4    Cervical radiculopathy M54.12     I nice discussion with the patient today our plan will be as follows.    Radiology: NDINGS:  The craniovertebral junction is normal. The cord is normal in size  and signal. The marrow signal is normal. Serial axial images reveal  the following: C2/C3  There is normal alignment and vertebral body height. The disc space  is normal. There is no evidence of canal or foraminal narrowing.  There is no evidence of  bulging or herniated disc. C3/C4  There is normal alignment and vertebral body height. The disc space  is normal. There is no evidence of canal or foraminal narrowing.  There is no evidence of bulging or herniated disc. C4/C5  Status post interbody fusion and anterior fixation. Unchanged right  paracentral uncovertebral joint osteophyte with narrowing of the  lateral recess and neural foramen. Minimal mass effect upon the  spinal cord C5/C6  Status post interbody fusion and anterior fixation. Minimal  asymmetrical uncovertebral joint hypertrophy toward the left without  canal or foraminal narrowing C6/C7  Status post interbody fusion and anterior fixation. Asymmetrical  osteophyte formation toward the left unchanged from the previous  exam. No measurable canal stenosis C7/T1  Mild asymmetrical bulging or osteophyte formation toward the right  with mild foraminal narrowing. No measurable canal stenosis. Finding  unchanged compared to the previous exam      IMPRESSION:  * Postoperative changes as described  *Right paracentral osteophyte at C4/C5 with narrowing of the lateral  recess and neural foramen *Minimal uncovertebral joint hypertrophy  toward the left without canal or foraminal narrowing *Minimal  osteophyte formation toward the left without canal or foraminal  narrowing *Minimal asymmetrical bulging disc or osteophyte formation  toward the right with minimal foraminal narrowing * There is no  measurable change compared to the previous exam.    Physically: Continue home exercise program.    Psychologically: Nothing at this time.    Medication: I will refill the patient's opioids today for 1 month.  The patient continues to see benefit and improvement in their quality of life and ability to maintain ADLs.  Patient educated about the risks of taking opioids and operating a motor vehicle.  Patient reports no adverse side effects to current medication regimen.  Current regimen does allow patient to maintain ADLs.   Patient reports no new neurologic symptoms, new pain areas, or exacerbation in pain today.  Patient reports they are happy with current treatment care path.    OARRS was reviewed and was consistent with the history.    Patient has been educated on the risks, benefits, and alternatives of controlled substances as well as the proper way to store these medications.  The patient and I discussed the nature of this medication and its side effects.  We discussed tolerance, physical dependence, psychological dependence, addiction and opioid-induced hyperalgesia.  We discussed the potential need to wean from this medication.  We discussed the availability of programs that can help with this process if necessary.  We discussed safety issues related to opioids including safe storage.  We discussed the fact that the patient should not drive an automobile or operate heavy machinery while taking this medication.  A prescription for naloxone was offered to the patient.  The patient will be re-evaluated for the need to continue opioid therapy in 60-90 days.  UDS completed at previous office visit was consistent with prescribing history.    Duration: Greater than 1 year.    Intervention: No intervention at this time.  The patient is good to follow-up with his neurologist with his new readings from his MRI.  We did discuss surgical intervention he has seen Dr. Neil in the past.

## 2023-12-12 DIAGNOSIS — F51.01 PRIMARY INSOMNIA: ICD-10-CM

## 2023-12-12 RX ORDER — AMITRIPTYLINE HYDROCHLORIDE 50 MG/1
50 TABLET, FILM COATED ORAL DAILY
Qty: 90 TABLET | Refills: 1 | Status: SHIPPED | OUTPATIENT
Start: 2023-12-12 | End: 2024-02-01 | Stop reason: WASHOUT

## 2023-12-12 RX ORDER — TRAZODONE HYDROCHLORIDE 100 MG/1
100 TABLET ORAL NIGHTLY
Qty: 90 TABLET | Refills: 1 | Status: SHIPPED | OUTPATIENT
Start: 2023-12-12 | End: 2024-06-07 | Stop reason: SDUPTHER

## 2023-12-26 PROCEDURE — RXMED WILLOW AMBULATORY MEDICATION CHARGE

## 2023-12-27 ENCOUNTER — PHARMACY VISIT (OUTPATIENT)
Dept: PHARMACY | Facility: CLINIC | Age: 53
End: 2023-12-27
Payer: MEDICARE

## 2024-01-02 ENCOUNTER — TELEPHONE (OUTPATIENT)
Dept: PRIMARY CARE | Facility: CLINIC | Age: 54
End: 2024-01-02
Payer: MEDICARE

## 2024-01-02 ENCOUNTER — TELEPHONE (OUTPATIENT)
Dept: PAIN MEDICINE | Facility: CLINIC | Age: 54
End: 2024-01-02
Payer: MEDICARE

## 2024-01-02 DIAGNOSIS — F41.9 ANXIETY: Primary | ICD-10-CM

## 2024-01-02 RX ORDER — ALPRAZOLAM 1 MG/1
1 TABLET ORAL EVERY 8 HOURS PRN
Qty: 90 TABLET | Refills: 0 | Status: SHIPPED | OUTPATIENT
Start: 2024-01-02 | End: 2024-02-02 | Stop reason: SDUPTHER

## 2024-01-02 NOTE — TELEPHONE ENCOUNTER
Geoffrey was notified he has a script for percocet available tomorrow 1/3/24.  He was also reminded of his follow up appointment 2/1/24.

## 2024-01-03 ENCOUNTER — PHARMACY VISIT (OUTPATIENT)
Dept: PHARMACY | Facility: CLINIC | Age: 54
End: 2024-01-03
Payer: MEDICARE

## 2024-01-03 PROCEDURE — RXMED WILLOW AMBULATORY MEDICATION CHARGE

## 2024-01-06 PROCEDURE — RXMED WILLOW AMBULATORY MEDICATION CHARGE

## 2024-01-15 PROCEDURE — RXMED WILLOW AMBULATORY MEDICATION CHARGE

## 2024-01-19 ENCOUNTER — PHARMACY VISIT (OUTPATIENT)
Dept: PHARMACY | Facility: CLINIC | Age: 54
End: 2024-01-19
Payer: MEDICARE

## 2024-02-01 ENCOUNTER — OFFICE VISIT (OUTPATIENT)
Dept: PAIN MEDICINE | Facility: CLINIC | Age: 54
End: 2024-02-01
Payer: MEDICARE

## 2024-02-01 VITALS
HEART RATE: 117 BPM | DIASTOLIC BLOOD PRESSURE: 98 MMHG | BODY MASS INDEX: 28.23 KG/M2 | SYSTOLIC BLOOD PRESSURE: 146 MMHG | WEIGHT: 220 LBS | HEIGHT: 74 IN | RESPIRATION RATE: 16 BRPM

## 2024-02-01 DIAGNOSIS — M96.1 CERVICAL POSTLAMINECTOMY SYNDROME: ICD-10-CM

## 2024-02-01 DIAGNOSIS — M96.1 POSTLAMINECTOMY SYNDROME, CERVICAL REGION: ICD-10-CM

## 2024-02-01 DIAGNOSIS — G89.29 OTHER CHRONIC PAIN: ICD-10-CM

## 2024-02-01 DIAGNOSIS — M47.812 CERVICAL ARTHRITIS: Primary | ICD-10-CM

## 2024-02-01 PROCEDURE — 3077F SYST BP >= 140 MM HG: CPT | Performed by: ANESTHESIOLOGY

## 2024-02-01 PROCEDURE — 3080F DIAST BP >= 90 MM HG: CPT | Performed by: ANESTHESIOLOGY

## 2024-02-01 PROCEDURE — 99214 OFFICE O/P EST MOD 30 MIN: CPT | Performed by: ANESTHESIOLOGY

## 2024-02-01 PROCEDURE — RXMED WILLOW AMBULATORY MEDICATION CHARGE

## 2024-02-01 PROCEDURE — 1036F TOBACCO NON-USER: CPT | Performed by: ANESTHESIOLOGY

## 2024-02-01 RX ORDER — OXYCODONE AND ACETAMINOPHEN 10; 325 MG/1; MG/1
1 TABLET ORAL EVERY 6 HOURS PRN
Qty: 120 TABLET | Refills: 0 | Status: SHIPPED | OUTPATIENT
Start: 2024-03-01 | End: 2024-02-29 | Stop reason: WASHOUT

## 2024-02-01 RX ORDER — DICLOFENAC SODIUM 10 MG/G
4 GEL TOPICAL 3 TIMES DAILY PRN
Qty: 360 G | Refills: 0 | Status: SHIPPED | OUTPATIENT
Start: 2024-02-01 | End: 2024-03-02

## 2024-02-01 RX ORDER — OXYCODONE AND ACETAMINOPHEN 10; 325 MG/1; MG/1
1 TABLET ORAL EVERY 6 HOURS PRN
Qty: 120 TABLET | Refills: 0 | Status: SHIPPED | OUTPATIENT
Start: 2024-02-01 | End: 2024-03-01 | Stop reason: SDUPTHER

## 2024-02-01 ASSESSMENT — ENCOUNTER SYMPTOMS
ENDOCRINE NEGATIVE: 1
GASTROINTESTINAL NEGATIVE: 1
EYES NEGATIVE: 1
CONSTITUTIONAL NEGATIVE: 1
BACK PAIN: 1
NECK STIFFNESS: 1
NECK PAIN: 1
RESPIRATORY NEGATIVE: 1
HEMATOLOGIC/LYMPHATIC NEGATIVE: 1
NUMBNESS: 1
CARDIOVASCULAR NEGATIVE: 1
ALLERGIC/IMMUNOLOGIC NEGATIVE: 1
WEAKNESS: 1
PSYCHIATRIC NEGATIVE: 1

## 2024-02-01 ASSESSMENT — PAIN SCALES - GENERAL: PAINLEVEL_OUTOF10: 8

## 2024-02-01 ASSESSMENT — PAIN DESCRIPTION - DESCRIPTORS: DESCRIPTORS: ACHING

## 2024-02-01 ASSESSMENT — PAIN - FUNCTIONAL ASSESSMENT: PAIN_FUNCTIONAL_ASSESSMENT: 0-10

## 2024-02-01 NOTE — PROGRESS NOTES
MEDICATION NAME: percocet  STRENGTH: 10/325mg  LAST FILL DATE: 1/3/24  DATE LAST TAKEN: 24  QUANTITY FILLED: 120  QUANTITY REMAININ  COUNT COMPLETED BY: СЕРГЕЙ NORTON RN and DURAN MONTANA MD      CONTROLLED SUBSTANCES AGREEMENT LAST SIGNED: 2022  ORT LAST COMPLETED:2023  Modified Osw[employer] disability form filled out annually.   percocet

## 2024-02-01 NOTE — PROGRESS NOTES
Subjective   Patient ID: Geoffrey Dan is a 53 y.o. male who presents for Neck Pain and Foot Pain.  Neck Pain   Associated symptoms include numbness and weakness.     Patient here today for management of his cervical postlaminectomy syndrome.  He also has a right BKA and has been struggling with a wound on his stump.  Pain is rated as anywhere between a 6-8 out of 10.  He reports that simple tasks such as shaving become very painful for him holding his arms up he will get numbness and weakness in both arms.  He has undergone neurosurgical evaluation several years ago and told him he was not a surgical candidate because of the risk of the surgery.  He continues to use medical THC as well as his current pain medication regimen which includes other opioid medications topicals anti-inflammatories and neuropathic's.  He reports that shaving his beard took him an hour and a half recently were normally would take him 15 to 20 minutes close holding his arms up was extremely painful for him.  He is not interested in additional surgical consultations or any interventional options at this time as it does scare him that he could be permanently paralyzed.  He does report that the tools that he does have do allow him to get through his day and complete his ADLs.  He does have challenging times though.  He is not reporting any adverse side effects of his current medication regimen and would like to continue on it at this time.  He would like a refill of his Voltaren gel.    The patient's current medication regimen continues to be effective for them without any adverse side effects.  The patient is able to complete all ADLs independently.  The patient reports no new symptoms today including numbness, tingling, weakness.  The patient reports no new neurological deficits or any new musculoskeletal issues.    Review of Systems   Constitutional: Negative.    HENT: Negative.     Eyes: Negative.    Respiratory: Negative.      Cardiovascular: Negative.    Gastrointestinal: Negative.    Endocrine: Negative.    Genitourinary: Negative.    Musculoskeletal:  Positive for back pain, neck pain and neck stiffness.   Skin: Negative.    Allergic/Immunologic: Negative.    Neurological:  Positive for weakness and numbness.   Hematological: Negative.    Psychiatric/Behavioral: Negative.         Objective   Physical Exam  Vitals and nursing note reviewed.   Constitutional:       Appearance: Normal appearance.   HENT:      Head: Normocephalic and atraumatic.      Right Ear: Ear canal and external ear normal.      Left Ear: Ear canal and external ear normal.      Nose: Nose normal.      Mouth/Throat:      Mouth: Mucous membranes are moist.      Pharynx: Oropharynx is clear.   Eyes:      Conjunctiva/sclera: Conjunctivae normal.      Pupils: Pupils are equal, round, and reactive to light.   Cardiovascular:      Rate and Rhythm: Normal rate.   Pulmonary:      Effort: Pulmonary effort is normal. No respiratory distress.   Musculoskeletal:      Cervical back: Neck supple. Spasms and tenderness present. Pain with movement present. Decreased range of motion.      Lumbar back: Tenderness present. Normal range of motion.      Left foot: Decreased range of motion.        Legs:       Comments: RIGHT bka - WOUND    Skin:     General: Skin is warm and dry.   Neurological:      Mental Status: He is alert.      Cranial Nerves: Cranial nerves 2-12 are intact.      Sensory: Sensory deficit present.      Motor: Weakness (hand) present.      Coordination: Coordination is intact.      Gait: Gait is intact.      Deep Tendon Reflexes:      Reflex Scores:       Bicep reflexes are 1+ on the right side and 1+ on the left side.       Brachioradialis reflexes are 1+ on the right side and 1+ on the left side.  Psychiatric:         Mood and Affect: Mood normal.         Thought Content: Thought content normal.         Assessment/Plan   Problem List Items Addressed This Visit              ICD-10-CM       Musculoskeletal and Injuries    Cervical postlaminectomy syndrome - Primary M96.1     Other Visit Diagnoses         Codes    Other chronic pain     G89.29          I nice discussion with the patient today our plan will be as follows.    Radiology: All available imaging was reviewed today.    Physically: Patient should continue home exercise program.    Psychologically: No issues at this time.    Medication: I will refill the patient's opioids today for 2 month.  The patient continues to see benefit and improvement in their quality of life and ability to maintain ADLs.  Patient educated about the risks of taking opioids and operating a motor vehicle.  Patient reports no adverse side effects to current medication regimen.  Current regimen does allow patient to maintain ADLs.  Patient reports no new neurologic symptoms, new pain areas, or exacerbation in pain today.  Patient reports they are happy with current treatment care path.    OARRS was reviewed and was consistent with the history.    Patient has been educated on the risks, benefits, and alternatives of controlled substances as well as the proper way to store these medications.  The patient and I discussed the nature of this medication and its side effects.  We discussed tolerance, physical dependence, psychological dependence, addiction and opioid-induced hyperalgesia.  We discussed the potential need to wean from this medication.  We discussed the availability of programs that can help with this process if necessary.  We discussed safety issues related to opioids including safe storage.  We discussed the fact that the patient should not drive an automobile or operate heavy machinery while taking this medication.  A prescription for naloxone was offered to the patient.  The patient will be re-evaluated for the need to continue opioid therapy in 60-90 days.  A opioid agreement was presented to the patient today.  The patient had the  opportunity to read through the agreement during the office visit and has signed the agreement today.  A copy of the agreement was given to the patient to take home for their records.    A opioid agreement was presented to the patient today.  The patient had the opportunity to read through the agreement during the office visit and has signed the agreement today.  A copy of the agreement was given to the patient to take home for their records.    A urine or saliva specimen was obtained for toxicology screening  Voltaren gel refilled today.          Duration: Greater than 1 year.    Intervention:  Nothing at this time.          Tomi Miranda MD 02/01/24 2:49 PM

## 2024-02-02 ENCOUNTER — PHARMACY VISIT (OUTPATIENT)
Dept: PHARMACY | Facility: CLINIC | Age: 54
End: 2024-02-02
Payer: MEDICARE

## 2024-02-02 ENCOUNTER — TELEPHONE (OUTPATIENT)
Dept: PRIMARY CARE | Facility: CLINIC | Age: 54
End: 2024-02-02
Payer: MEDICARE

## 2024-02-02 DIAGNOSIS — F41.9 ANXIETY: ICD-10-CM

## 2024-02-02 RX ORDER — ALPRAZOLAM 1 MG/1
1 TABLET ORAL EVERY 8 HOURS PRN
Qty: 90 TABLET | Refills: 0 | Status: SHIPPED | OUTPATIENT
Start: 2024-02-02 | End: 2024-03-04 | Stop reason: SDUPTHER

## 2024-02-10 LAB — SCAN RESULT: NORMAL

## 2024-02-27 PROBLEM — I74.3: Status: RESOLVED | Noted: 2023-09-19 | Resolved: 2024-02-27

## 2024-02-27 PROBLEM — F31.10 BIPOLAR AFFECTIVE DISORDER, CURRENT EPISODE MANIC (MULTI): Chronic | Status: RESOLVED | Noted: 2022-04-21 | Resolved: 2024-02-27

## 2024-02-27 NOTE — PROGRESS NOTES
[facility]: [facility]  PROGRESS NOTE      Geoffrey Dan is a 53 y.o. male that is presenting today for Follow-up (6 month).    Assessment/Plan   Diagnoses and all orders for this visit:  Type 2 diabetes mellitus with diabetic neuropathy, with long-term current use of insulin (CMS/[facility])  -     POCT glycosylated hemoglobin (Hb A1C) manually resulted  -     CBC and Auto Differential; Future  -     Comprehensive Metabolic Panel; Future  -     Lipid Panel; Future  -     Hemoglobin A1C; Future  -     TSH with reflex to Free T4 if abnormal; Future  -     insulin aspart (NovoLOG) 100 unit/mL (3 mL) pen; Inject 20 Units under the skin 3 times a day before meals  Anxiety  Uncomplicated opioid dependence (CMS/[facility])  Cervical spinal stenosis  Encounter for routine laboratory testing  -     CBC and Auto Differential; Future  -     Comprehensive Metabolic Panel; Future  -     Lipid Panel; Future  -     Hemoglobin A1C; Future  -     Vitamin D 25-Hydroxy,Total (for eval of Vitamin D levels); Future  -     TSH with reflex to Free T4 if abnormal; Future  -     Prostate Specific Antigen; Future  Vitamin D deficiency  -     Vitamin D 25-Hydroxy,Total (for eval of Vitamin D levels); Future  Encounter for prostate cancer screening  -     Prostate Specific Antigen; Future  Peripheral vascular disease (CMS/[facility])  Primary hypertension  -     lisinopril 10 mg tablet; Take 1 tablet (10 mg) by mouth once daily.    This patient is prescribed a controlled substance.  A controlled substance agreement has been completed and scanned into the chart.  The patient understands that they will need to be seen every 90 days and that [place] will be queried and evaluated for inconsistencies at 90 days intervals or more frequently as indicated.    Toxicology screen reviewed and appropriate.    Narcan Rx offered due to concomitant use of opioid (Prescribed by pain mgmt).    Declines vaccinations.    The glucose is under good control and the  hypertension is uncontrolled.  He relates everything including high blood pressure to pain and feels that if he had more pain medicine the blood pressure would be better controlled.  He is worried that his blood pressure will get too low.  After long discussion he agrees to take at least 10 mg of lisinopril, let me know his readings and we can uptitrate from there.  Check laboratory studies as ordered.    Subjective   HPI  He sees pain mgmt for chronic neck pian post laminectomy with severe pain into the arms affecting ADLs.  The pain medication reduces pain and increases his level of function.  Gabapentin, topical Voltaren and medical marijuana are helpful as well.  No adverse effects such as over sedation have been noted.    Review of Systems   Constitutional:  Negative for fever.   Respiratory:  Negative for shortness of breath.    Cardiovascular:  Negative for chest pain.   Gastrointestinal:  Negative for abdominal pain.   All other systems reviewed and are negative.     Objective   Vitals:    02/29/24 1511   BP: (!) 180/120   Pulse: (!) 112   Temp: 36.3 °C (97.4 °F)   SpO2: 95%      Body mass index is 33.62 kg/m².  Physical Exam  Vitals reviewed.   Constitutional:       Appearance: Normal appearance.   Cardiovascular:      Rate and Rhythm: Normal rate and regular rhythm.      Heart sounds: No murmur heard.  Pulmonary:      Breath sounds: Normal breath sounds. No wheezing, rhonchi or rales.   Musculoskeletal:      Right lower leg: No edema.      Left lower leg: No edema.       Diagnostic Results   Lab Results   Component Value Date    GLUCOSE 126 (H) 12/19/2022    CALCIUM 9.3 12/19/2022     12/19/2022    K 4.0 12/19/2022    CO2 28 12/19/2022    CL 98 12/19/2022    BUN 8 12/19/2022    CREATININE 0.7 12/19/2022     Lab Results   Component Value Date    ALT 27 12/19/2022    AST 27 12/19/2022    ALKPHOS 84 12/19/2022    BILITOT 0.3 12/19/2022     Lab Results   Component Value Date    WBC 6.4 05/20/2021    HGB  "15.1 05/20/2021    HCT 44.7 05/20/2021    MCV 88.5 05/20/2021     05/20/2021     Lab Results   Component Value Date    CHOL 196 12/19/2022    CHOL 211 (H) 09/24/2020    CHOL 199 09/30/2019     Lab Results   Component Value Date    HDL 36 (L) 12/19/2022    HDL 36 (L) 09/24/2020    HDL 40 (L) 09/30/2019     Lab Results   Component Value Date    LDLCALC 81 12/19/2022    LDLCALC 144 (H) 09/24/2020    LDLCALC  09/30/2019     Unable to report calculated LDL due to increased triglycerides. Direct     Lab Results   Component Value Date    TRIG 397 (H) 12/19/2022    TRIG 156 (H) 09/24/2020    TRIG 492 (H) 09/30/2019     No components found for: \"CHOLHDL\"  Lab Results   Component Value Date    HGBA1C 6.4 02/29/2024     Other labs not included in the list above were reviewed either before or during this encounter.    History    Past Medical History:   Diagnosis Date    Personal history of other endocrine, nutritional and metabolic disease     History of diabetes mellitus     Past Surgical History:   Procedure Laterality Date    CT AORTA AND BILATERAL ILIOFEMORAL RUNOFF ANGIOGRAM W AND/OR WO IV CONTRAST  12/6/2017    CT AORTA AND BILATERAL ILIOFEMORAL RUNOFF ANGIOGRAM W AND/OR WO IV CONTRAST 12/6/2017 [facility] INPATIENT LEGACY    OTHER SURGICAL HISTORY  05/29/2013    Leg Repair    OTHER SURGICAL HISTORY  01/30/2018    Amputation Of Leg Below Knee     Family History   Adopted: Yes     Social History     Socioeconomic History    Marital status:      Spouse name: Not on file    Number of children: Not on file    Years of education: Not on file    Highest education level: Not on file   Occupational History    Not on file   Tobacco Use    Smoking status: Former     Types: Cigarettes    Smokeless tobacco: Never   Vaping Use    Vaping Use: Never used   Substance and Sexual Activity    Alcohol use: Never    Drug use: Yes     Comment: medical marijuana card    Sexual activity: Not on file   Other Topics Concern    Not on file " "  Social History Narrative    Not on file     Social Determinants of Health     Financial Resource Strain: Not on file   Food Insecurity: Not on file   Transportation Needs: Not on file   Physical Activity: Not on file   Stress: Not on file   Social Connections: Not on file   Intimate Partner Violence: Not on file   Housing Stability: Not on file     Allergies   Allergen Reactions    Atorvastatin Hives, Shortness of breath and Other     Facial edema   Resp distress    Fenofibrate Nanocrystallized Shortness of breath and Other     Facial edema   Resp distress    Insulin Glargine Other and Shortness of breath    Moxifloxacin Shortness of breath and Rash     Racing heart  splotches    Tramadol Shortness of breath    Other Other     \"All Antihypertensives\"  Throat swelling  Passed out      Pravastatin Unknown    Apixaban Rash     Racing heart     Current Outpatient Medications on File Prior to Visit   Medication Sig Dispense Refill    ALPRAZolam (Xanax) 1 mg tablet Take 1 tablet (1 mg) by mouth every 8 hours if needed for anxiety. 90 tablet 0    aspirin 81 mg chewable tablet Chew 1 tablet (81 mg) once daily.      blood sugar diagnostic (Accu-Chek Guide test strips) strip USE AS DIRECTED FOR TESTING BLOOD GLUCOSE TWICE DAILY      diclofenac sodium (Voltaren) 1 % gel Apply 4.5 inches (4 g) topically 3 times a day as needed (pain) as directed 360 g 0    fluticasone (Flonase) 50 mcg/actuation nasal spray Administer 1 spray into each nostril once daily. Shake liquid      gabapentin (Neurontin) 300 mg capsule Take 2 capsules (600 mg) by mouth 3 times a day.      insulin glargine (Lantus Solostar U-100 Insulin) 100 unit/mL (3 mL) pen Inject 60 Units under the skin once daily in the morning. 45 mL 3    lidocaine (Lidoderm) 5 % patch Place 1 patch on the skin once daily. remove after 12 hours      linaCLOtide (Linzess) 145 mcg capsule Take 1 capsule (145 mcg) by mouth once daily in the morning. Take before meals.      medical " "cannabis Medical Marijuana      metFORMIN (Glucophage) 1,000 mg tablet Take 1 tablet (1,000 mg) by mouth 2 times a day with meals.      oxyCODONE-acetaminophen (Percocet)  mg tablet Take 1 tablet by mouth every 6 hours if needed for severe pain (7 - 10). 120 tablet 0    pen needle 1/2\" (BD Ultra-Fine Orig Pen Needle) 29G X 12mm needle every 6 hours. 1 subcutaneously four times a day      tiZANidine (Zanaflex) 4 mg tablet Take 1 tablet (4 mg) by mouth every 4 hours if needed.      traZODone (Desyrel) 100 mg tablet Take 1 tablet (100 mg) by mouth once daily at bedtime. 90 tablet 1    [DISCONTINUED] insulin aspart (NovoLOG) 100 unit/mL (3 mL) pen INJECT UNDER THE SKIN AS DIRECTED BEFORE MEALS UP TO 60 UNITS DAILY 15 mL 11    [DISCONTINUED] insulin aspart (NovoLOG FlexPen U-100 Insulin) 100 unit/mL (3 mL) pen Inject 20 Units under the skin 3 times a day. up to 60 units total per day      [DISCONTINUED] oxyCODONE-acetaminophen (Percocet)  mg tablet Take 1 tablet by mouth every 6 hours if needed for severe pain (7 - 10) (Patient not taking: Reported on 2/29/2024 Do not start before March 1, 2024.) 120 tablet 0     No current facility-administered medications on file prior to visit.     Immunization History   Administered Date(s) Administered    Flu vaccine (IIV4), preservative free *Check age/dose* 10/18/2013    Influenza, Unspecified 12/02/2009, 10/26/2010, 11/14/2012    Influenza, seasonal, injectable 10/16/2017    Influenza, seasonal, injectable, preservative free 10/03/2016    Pneumococcal polysaccharide vaccine, 23-valent, age 2 years and older (PNEUMOVAX 23) 12/02/2009    Tdap vaccine, age 7 year and older (BOOSTRIX, ADACEL) 12/20/2019     Patient's medical history was reviewed and updated either before or during this encounter.       Chas Cordova MD  "

## 2024-02-29 ENCOUNTER — OFFICE VISIT (OUTPATIENT)
Dept: PRIMARY CARE | Facility: CLINIC | Age: 54
End: 2024-02-29
Payer: MEDICARE

## 2024-02-29 VITALS
HEIGHT: 74 IN | OXYGEN SATURATION: 95 % | TEMPERATURE: 97.4 F | SYSTOLIC BLOOD PRESSURE: 180 MMHG | HEART RATE: 112 BPM | BODY MASS INDEX: 33.62 KG/M2 | WEIGHT: 262 LBS | DIASTOLIC BLOOD PRESSURE: 120 MMHG

## 2024-02-29 DIAGNOSIS — M48.02 CERVICAL SPINAL STENOSIS: ICD-10-CM

## 2024-02-29 DIAGNOSIS — I73.9 PERIPHERAL VASCULAR DISEASE (CMS-HCC): ICD-10-CM

## 2024-02-29 DIAGNOSIS — Z12.5 ENCOUNTER FOR PROSTATE CANCER SCREENING: ICD-10-CM

## 2024-02-29 DIAGNOSIS — E11.40 TYPE 2 DIABETES MELLITUS WITH DIABETIC NEUROPATHY, WITH LONG-TERM CURRENT USE OF INSULIN (MULTI): Primary | ICD-10-CM

## 2024-02-29 DIAGNOSIS — F11.20 UNCOMPLICATED OPIOID DEPENDENCE (MULTI): ICD-10-CM

## 2024-02-29 DIAGNOSIS — Z79.4 TYPE 2 DIABETES MELLITUS WITH DIABETIC NEUROPATHY, WITH LONG-TERM CURRENT USE OF INSULIN (MULTI): Primary | ICD-10-CM

## 2024-02-29 DIAGNOSIS — F41.9 ANXIETY: ICD-10-CM

## 2024-02-29 DIAGNOSIS — I10 PRIMARY HYPERTENSION: ICD-10-CM

## 2024-02-29 DIAGNOSIS — E55.9 VITAMIN D DEFICIENCY: ICD-10-CM

## 2024-02-29 DIAGNOSIS — Z01.89 ENCOUNTER FOR ROUTINE LABORATORY TESTING: ICD-10-CM

## 2024-02-29 LAB — POC HEMOGLOBIN A1C: 6.4 % (ref 4.2–6.5)

## 2024-02-29 PROCEDURE — 3080F DIAST BP >= 90 MM HG: CPT | Performed by: INTERNAL MEDICINE

## 2024-02-29 PROCEDURE — 83036 HEMOGLOBIN GLYCOSYLATED A1C: CPT | Mod: QW,MUE | Performed by: INTERNAL MEDICINE

## 2024-02-29 PROCEDURE — 99214 OFFICE O/P EST MOD 30 MIN: CPT | Performed by: INTERNAL MEDICINE

## 2024-02-29 PROCEDURE — 4010F ACE/ARB THERAPY RXD/TAKEN: CPT | Performed by: INTERNAL MEDICINE

## 2024-02-29 PROCEDURE — 3077F SYST BP >= 140 MM HG: CPT | Performed by: INTERNAL MEDICINE

## 2024-02-29 PROCEDURE — 1036F TOBACCO NON-USER: CPT | Performed by: INTERNAL MEDICINE

## 2024-02-29 PROCEDURE — RXMED WILLOW AMBULATORY MEDICATION CHARGE

## 2024-02-29 RX ORDER — INSULIN ASPART 100 [IU]/ML
20 INJECTION, SOLUTION INTRAVENOUS; SUBCUTANEOUS
Qty: 15 ML | Refills: 11 | Status: SHIPPED | OUTPATIENT
Start: 2024-02-29 | End: 2024-04-29

## 2024-02-29 RX ORDER — LISINOPRIL 10 MG/1
10 TABLET ORAL DAILY
Qty: 30 TABLET | Refills: 11 | Status: SHIPPED | OUTPATIENT
Start: 2024-02-29 | End: 2025-02-28

## 2024-02-29 ASSESSMENT — PATIENT HEALTH QUESTIONNAIRE - PHQ9
SUM OF ALL RESPONSES TO PHQ9 QUESTIONS 1 AND 2: 0
1. LITTLE INTEREST OR PLEASURE IN DOING THINGS: NOT AT ALL
2. FEELING DOWN, DEPRESSED OR HOPELESS: NOT AT ALL

## 2024-02-29 ASSESSMENT — PAIN SCALES - GENERAL: PAINLEVEL: 0-NO PAIN

## 2024-02-29 ASSESSMENT — ENCOUNTER SYMPTOMS
FEVER: 0
SHORTNESS OF BREATH: 0
ABDOMINAL PAIN: 0

## 2024-03-01 ENCOUNTER — PHARMACY VISIT (OUTPATIENT)
Dept: PHARMACY | Facility: CLINIC | Age: 54
End: 2024-03-01
Payer: MEDICARE

## 2024-03-01 DIAGNOSIS — M96.1 CERVICAL POSTLAMINECTOMY SYNDROME: ICD-10-CM

## 2024-03-01 DIAGNOSIS — E11.40 TYPE 2 DIABETES MELLITUS WITH DIABETIC NEUROPATHY, WITH LONG-TERM CURRENT USE OF INSULIN (MULTI): Primary | ICD-10-CM

## 2024-03-01 DIAGNOSIS — Z79.4 TYPE 2 DIABETES MELLITUS WITH DIABETIC NEUROPATHY, WITH LONG-TERM CURRENT USE OF INSULIN (MULTI): Primary | ICD-10-CM

## 2024-03-01 DIAGNOSIS — M96.1 POSTLAMINECTOMY SYNDROME, CERVICAL REGION: ICD-10-CM

## 2024-03-01 DIAGNOSIS — G89.29 OTHER CHRONIC PAIN: ICD-10-CM

## 2024-03-01 PROCEDURE — RXMED WILLOW AMBULATORY MEDICATION CHARGE

## 2024-03-01 RX ORDER — OXYCODONE AND ACETAMINOPHEN 10; 325 MG/1; MG/1
1 TABLET ORAL EVERY 6 HOURS PRN
Qty: 120 TABLET | Refills: 0 | Status: SHIPPED | OUTPATIENT
Start: 2024-03-01 | End: 2024-03-19 | Stop reason: SDUPTHER

## 2024-03-01 RX ORDER — PEN NEEDLE, DIABETIC 30 GX3/16"
NEEDLE, DISPOSABLE MISCELLANEOUS
Qty: 100 EACH | Refills: 11 | Status: SHIPPED | OUTPATIENT
Start: 2024-03-01 | End: 2025-03-01

## 2024-03-04 ENCOUNTER — TELEPHONE (OUTPATIENT)
Dept: PRIMARY CARE | Facility: CLINIC | Age: 54
End: 2024-03-04
Payer: MEDICARE

## 2024-03-04 DIAGNOSIS — F41.9 ANXIETY: ICD-10-CM

## 2024-03-04 PROCEDURE — RXMED WILLOW AMBULATORY MEDICATION CHARGE

## 2024-03-04 RX ORDER — ALPRAZOLAM 1 MG/1
1 TABLET ORAL EVERY 8 HOURS PRN
Qty: 90 TABLET | Refills: 0 | Status: SHIPPED | OUTPATIENT
Start: 2024-03-04 | End: 2024-04-02 | Stop reason: SDUPTHER

## 2024-03-05 ENCOUNTER — PHARMACY VISIT (OUTPATIENT)
Dept: PHARMACY | Facility: CLINIC | Age: 54
End: 2024-03-05
Payer: MEDICARE

## 2024-03-11 DIAGNOSIS — M48.02 CERVICAL SPINAL STENOSIS: Primary | ICD-10-CM

## 2024-03-11 RX ORDER — TIZANIDINE 4 MG/1
4 TABLET ORAL EVERY 4 HOURS PRN
Qty: 540 TABLET | Refills: 0 | Status: SHIPPED | OUTPATIENT
Start: 2024-03-11 | End: 2024-06-10

## 2024-03-19 ENCOUNTER — OFFICE VISIT (OUTPATIENT)
Dept: PAIN MEDICINE | Facility: CLINIC | Age: 54
End: 2024-03-19
Payer: MEDICARE

## 2024-03-19 VITALS
HEIGHT: 74 IN | BODY MASS INDEX: 33.62 KG/M2 | OXYGEN SATURATION: 96 % | HEART RATE: 105 BPM | SYSTOLIC BLOOD PRESSURE: 138 MMHG | WEIGHT: 262 LBS | RESPIRATION RATE: 16 BRPM | DIASTOLIC BLOOD PRESSURE: 88 MMHG

## 2024-03-19 DIAGNOSIS — G89.29 OTHER CHRONIC PAIN: ICD-10-CM

## 2024-03-19 DIAGNOSIS — E11.40 PAINFUL DIABETIC NEUROPATHY (MULTI): Primary | ICD-10-CM

## 2024-03-19 DIAGNOSIS — M96.1 POSTLAMINECTOMY SYNDROME, CERVICAL REGION: ICD-10-CM

## 2024-03-19 DIAGNOSIS — M96.1 CERVICAL POSTLAMINECTOMY SYNDROME: ICD-10-CM

## 2024-03-19 DIAGNOSIS — G89.4 CHRONIC PAIN SYNDROME: ICD-10-CM

## 2024-03-19 PROCEDURE — 3075F SYST BP GE 130 - 139MM HG: CPT | Performed by: ANESTHESIOLOGY

## 2024-03-19 PROCEDURE — 99214 OFFICE O/P EST MOD 30 MIN: CPT | Performed by: ANESTHESIOLOGY

## 2024-03-19 PROCEDURE — 3079F DIAST BP 80-89 MM HG: CPT | Performed by: ANESTHESIOLOGY

## 2024-03-19 PROCEDURE — 1036F TOBACCO NON-USER: CPT | Performed by: ANESTHESIOLOGY

## 2024-03-19 PROCEDURE — 4010F ACE/ARB THERAPY RXD/TAKEN: CPT | Performed by: ANESTHESIOLOGY

## 2024-03-19 RX ORDER — OXYCODONE AND ACETAMINOPHEN 10; 325 MG/1; MG/1
1 TABLET ORAL EVERY 6 HOURS PRN
Qty: 120 TABLET | Refills: 0 | Status: SHIPPED | OUTPATIENT
Start: 2024-04-30 | End: 2024-05-23 | Stop reason: SDUPTHER

## 2024-03-19 RX ORDER — OXYCODONE AND ACETAMINOPHEN 10; 325 MG/1; MG/1
1 TABLET ORAL EVERY 6 HOURS PRN
Qty: 120 TABLET | Refills: 0 | Status: SHIPPED | OUTPATIENT
Start: 2024-03-30 | End: 2024-04-29

## 2024-03-19 ASSESSMENT — ENCOUNTER SYMPTOMS
LEG PAIN: 1
NECK PAIN: 1
HEMATOLOGIC/LYMPHATIC NEGATIVE: 1
ALLERGIC/IMMUNOLOGIC NEGATIVE: 1
ENDOCRINE NEGATIVE: 1
BACK PAIN: 1
NECK STIFFNESS: 1
PSYCHIATRIC NEGATIVE: 1
CONSTITUTIONAL NEGATIVE: 1
WEAKNESS: 1
EYES NEGATIVE: 1
CARDIOVASCULAR NEGATIVE: 1
RESPIRATORY NEGATIVE: 1
GASTROINTESTINAL NEGATIVE: 1
NUMBNESS: 1

## 2024-03-19 ASSESSMENT — PATIENT HEALTH QUESTIONNAIRE - PHQ9
SUM OF ALL RESPONSES TO PHQ9 QUESTIONS 1 AND 2: 0
2. FEELING DOWN, DEPRESSED OR HOPELESS: NOT AT ALL
1. LITTLE INTEREST OR PLEASURE IN DOING THINGS: NOT AT ALL

## 2024-03-19 ASSESSMENT — PAIN - FUNCTIONAL ASSESSMENT: PAIN_FUNCTIONAL_ASSESSMENT: 0-10

## 2024-03-19 ASSESSMENT — PAIN SCALES - GENERAL: PAINLEVEL_OUTOF10: 9

## 2024-03-19 NOTE — PROGRESS NOTES
Subjective   Patient ID: Geoffrey Dan is a 53 y.o. male who presents for Neck Pain, Leg Pain, and Foot Pain.  Neck Pain   Associated symptoms include leg pain, numbness and weakness.   Leg Pain   Associated symptoms include numbness.   Patient here today for follow up today.  He repots his pain as a 8-9/10 today.  He has been put on blood pressure medication and he is feeling a little better now.  He suffers with chronic neck pain after his neck surgery in the past.  He states that continue to get numbness and progressive pain in the left arm.     The patient's current medication regimen continues to be effective for them without any adverse side effects.  The patient is able to complete all ADLs independently.  The patient reports no new symptoms today including numbness, tingling, weakness.  The patient reports no new neurological deficits or any new musculoskeletal issues.      Review of Systems   Constitutional: Negative.    HENT: Negative.     Eyes: Negative.    Respiratory: Negative.     Cardiovascular: Negative.    Gastrointestinal: Negative.    Endocrine: Negative.    Genitourinary: Negative.    Musculoskeletal:  Positive for back pain, neck pain and neck stiffness.   Skin: Negative.    Allergic/Immunologic: Negative.    Neurological:  Positive for weakness and numbness.   Hematological: Negative.    Psychiatric/Behavioral: Negative.         Objective   Physical Exam  Vitals and nursing note reviewed.   Constitutional:       Appearance: Normal appearance.   HENT:      Head: Normocephalic and atraumatic.      Right Ear: Ear canal and external ear normal.      Left Ear: Ear canal and external ear normal.      Nose: Nose normal.      Mouth/Throat:      Mouth: Mucous membranes are moist.      Pharynx: Oropharynx is clear.   Eyes:      Conjunctiva/sclera: Conjunctivae normal.      Pupils: Pupils are equal, round, and reactive to light.   Cardiovascular:      Rate and Rhythm: Normal rate.   Pulmonary:      Effort:  Pulmonary effort is normal. No respiratory distress.   Musculoskeletal:      Cervical back: Neck supple. Spasms and tenderness present. Pain with movement present. Decreased range of motion.      Lumbar back: Tenderness present. Normal range of motion.      Left foot: Decreased range of motion.      Comments: RIGHT bka - WOUND    Skin:     General: Skin is warm and dry.   Neurological:      Mental Status: He is alert.      Cranial Nerves: Cranial nerves 2-12 are intact.      Sensory: Sensory deficit present.      Motor: Weakness (hand) present.      Coordination: Coordination is intact.      Gait: Gait is intact.      Deep Tendon Reflexes:      Reflex Scores:       Bicep reflexes are 1+ on the right side and 1+ on the left side.       Brachioradialis reflexes are 1+ on the right side and 1+ on the left side.  Psychiatric:         Mood and Affect: Mood normal.         Thought Content: Thought content normal.       Assessment/Plan   Problem List Items Addressed This Visit             ICD-10-CM       Musculoskeletal and Injuries    Cervical postlaminectomy syndrome M96.1       Neuro    Painful diabetic neuropathy (CMS/HCC) - Primary E11.40    Chronic pain syndrome G89.4        I nice discussion with the patient today our plan will be as follows.    Radiology: All available imaging was reviewed today.    Physically: Patient should continue home exercise program.    Psychologically: No issues at this time.    Medication: I will refill the patient's opioids today for 2 month.  The patient continues to see benefit and improvement in their quality of life and ability to maintain ADLs.  Patient educated about the risks of taking opioids and operating a motor vehicle.  Patient reports no adverse side effects to current medication regimen.  Current regimen does allow patient to maintain ADLs.  Patient reports no new neurologic symptoms, new pain areas, or exacerbation in pain today.  Patient reports they are happy with current  treatment care path.    [place] was reviewed and was consistent with the history.    Patient has been educated on the risks, benefits, and alternatives of controlled substances as well as the proper way to store these medications.  The patient and I discussed the nature of this medication and its side effects.  We discussed tolerance, physical dependence, psychological dependence, addiction and opioid-induced hyperalgesia.  We discussed the potential need to wean from this medication.  We discussed the availability of programs that can help with this process if necessary.  We discussed safety issues related to opioids including safe storage.  We discussed the fact that the patient should not drive an automobile or operate heavy machinery while taking this medication.  A prescription for naloxone was offered to the patient.  The patient will be re-evaluated for the need to continue opioid therapy in 60-90 days.      Duration: Greater than 1 year.    Intervention:  No intervention at this time.        Tomi Miranda MD 03/19/24 1:54 PM

## 2024-03-19 NOTE — PROGRESS NOTES
MEDICATION NAME: percocet  STRENGTH: 10/325mg  LAST FILL DATE: 3/1/24  DATE LAST TAKEN: 3/19/24  QUANTITY FILLED: 120  QUANTITY REMAININ  COUNT COMPLETED BY: СЕРГЕЙ NORTON RN and DURAN MONTANA MD      UDS LAST COMPLETED: 2024  CONTROLLED SUBSTANCES AGREEMENT LAST SIGNED: 2024  ORT LAST COMPLETED:2023  Modified [employer] disability form filled out annually.

## 2024-03-25 DIAGNOSIS — F41.9 ANXIETY: Primary | ICD-10-CM

## 2024-03-25 RX ORDER — AMITRIPTYLINE HYDROCHLORIDE 50 MG/1
50 TABLET, FILM COATED ORAL NIGHTLY
Qty: 30 TABLET | Refills: 1 | Status: SHIPPED | OUTPATIENT
Start: 2024-03-25 | End: 2024-04-29

## 2024-03-25 RX ORDER — AMITRIPTYLINE HYDROCHLORIDE 50 MG/1
50 TABLET, FILM COATED ORAL NIGHTLY
COMMUNITY
End: 2024-03-25 | Stop reason: SDUPTHER

## 2024-03-29 PROCEDURE — RXMED WILLOW AMBULATORY MEDICATION CHARGE

## 2024-03-30 ENCOUNTER — PHARMACY VISIT (OUTPATIENT)
Dept: PHARMACY | Facility: CLINIC | Age: 54
End: 2024-03-30
Payer: MEDICARE

## 2024-03-30 PROCEDURE — RXMED WILLOW AMBULATORY MEDICATION CHARGE

## 2024-04-02 DIAGNOSIS — F41.9 ANXIETY: ICD-10-CM

## 2024-04-03 DIAGNOSIS — F41.9 ANXIETY: Primary | ICD-10-CM

## 2024-04-03 PROCEDURE — RXMED WILLOW AMBULATORY MEDICATION CHARGE

## 2024-04-03 RX ORDER — ALPRAZOLAM 1 MG/1
1 TABLET ORAL EVERY 8 HOURS PRN
Qty: 21 TABLET | Refills: 0 | Status: SHIPPED | OUTPATIENT
Start: 2024-04-03 | End: 2024-04-10 | Stop reason: SDUPTHER

## 2024-04-03 RX ORDER — NALOXONE HYDROCHLORIDE 4 MG/.1ML
4 SPRAY NASAL AS NEEDED
Qty: 2 EACH | Refills: 0 | Status: SHIPPED | OUTPATIENT
Start: 2024-04-03

## 2024-04-03 NOTE — PROGRESS NOTES
Reviewed oarrs in coverage - pt taking multiple meds - will fill 1 week meds - also made naloxone avialable to avoid acidental overdose.

## 2024-04-05 ENCOUNTER — PHARMACY VISIT (OUTPATIENT)
Dept: PHARMACY | Facility: CLINIC | Age: 54
End: 2024-04-05
Payer: MEDICARE

## 2024-04-05 PROCEDURE — RXMED WILLOW AMBULATORY MEDICATION CHARGE

## 2024-04-05 PROCEDURE — RXOTC WILLOW AMBULATORY OTC CHARGE

## 2024-04-10 ENCOUNTER — TELEPHONE (OUTPATIENT)
Dept: PRIMARY CARE | Facility: CLINIC | Age: 54
End: 2024-04-10
Payer: MEDICARE

## 2024-04-10 DIAGNOSIS — F41.9 ANXIETY: ICD-10-CM

## 2024-04-10 PROCEDURE — RXMED WILLOW AMBULATORY MEDICATION CHARGE

## 2024-04-10 RX ORDER — ALPRAZOLAM 1 MG/1
1 TABLET ORAL EVERY 8 HOURS PRN
Qty: 90 TABLET | Refills: 0 | Status: SHIPPED | OUTPATIENT
Start: 2024-04-10 | End: 2024-05-10 | Stop reason: SDUPTHER

## 2024-04-12 ENCOUNTER — PHARMACY VISIT (OUTPATIENT)
Dept: PHARMACY | Facility: CLINIC | Age: 54
End: 2024-04-12
Payer: MEDICARE

## 2024-04-15 ENCOUNTER — TELEPHONE (OUTPATIENT)
Dept: PRIMARY CARE | Facility: CLINIC | Age: 54
End: 2024-04-15
Payer: MEDICARE

## 2024-04-15 NOTE — TELEPHONE ENCOUNTER
Really think he needs an ER visit so he can have some testing - not sure what I'd be prescribing an antibiotic for.

## 2024-04-15 NOTE — TELEPHONE ENCOUNTER
"Pt states his \"stool smells like bad breath\", sweating, sinus congestion w/green mucus, sugars swing from 280 and 74, general malaise, weak, confused. States cramping 1 mo ago, but it's been going on for months. No diarrhea.  Pt feels his [facility] is making him sick with a \"lung infection since it was a recalled devise poisoning me\".   Offered pt appt tomorrow, pt states can't drive he is so sick.   Pt requests antibiotics. Please advise.    "

## 2024-04-29 DIAGNOSIS — F41.9 ANXIETY: ICD-10-CM

## 2024-04-29 DIAGNOSIS — Z79.4 TYPE 2 DIABETES MELLITUS WITH DIABETIC NEUROPATHY, WITH LONG-TERM CURRENT USE OF INSULIN (MULTI): ICD-10-CM

## 2024-04-29 DIAGNOSIS — E11.40 TYPE 2 DIABETES MELLITUS WITH DIABETIC NEUROPATHY, WITH LONG-TERM CURRENT USE OF INSULIN (MULTI): ICD-10-CM

## 2024-04-29 PROCEDURE — RXMED WILLOW AMBULATORY MEDICATION CHARGE

## 2024-04-29 RX ORDER — INSULIN ASPART 100 [IU]/ML
INJECTION, SOLUTION INTRAVENOUS; SUBCUTANEOUS
Qty: 54 ML | Refills: 3 | Status: SHIPPED | OUTPATIENT
Start: 2024-04-29

## 2024-04-29 RX ORDER — AMITRIPTYLINE HYDROCHLORIDE 50 MG/1
TABLET, FILM COATED ORAL
Qty: 90 TABLET | Refills: 3 | Status: SHIPPED | OUTPATIENT
Start: 2024-04-29

## 2024-04-30 ENCOUNTER — PHARMACY VISIT (OUTPATIENT)
Dept: PHARMACY | Facility: CLINIC | Age: 54
End: 2024-04-30
Payer: MEDICARE

## 2024-04-30 PROCEDURE — RXMED WILLOW AMBULATORY MEDICATION CHARGE

## 2024-05-02 ENCOUNTER — APPOINTMENT (OUTPATIENT)
Dept: PAIN MEDICINE | Facility: CLINIC | Age: 54
End: 2024-05-02
Payer: MEDICARE

## 2024-05-09 ASSESSMENT — ENCOUNTER SYMPTOMS
FEVER: 0
ABDOMINAL PAIN: 0
SHORTNESS OF BREATH: 0

## 2024-05-09 NOTE — PROGRESS NOTES
[facility]: [facility]  PROGRESS NOTE      Geoffrey Dan is a 53 y.o. male that is presenting today for No chief complaint on file..    Assessment/Plan   Diagnoses and all orders for this visit:  Type 2 diabetes mellitus with diabetic neuropathy, with long-term current use of insulin (Multi)  Primary hypertension  Cervical spinal stenosis  Vitamin D deficiency  Peripheral vascular disease ([facility])    Will prescribe a course of doxycycline and will give him an albuterol inhaler.  He will let us know in 1 to 2 weeks if his symptoms are not better.  He has not been following with sleep medicine and he has multiple questions regarding his CPAP machine and whether he has the right equipment.  Would like to arrange for sleep medicine consultation.    This patient is prescribed a controlled substance.  A controlled substance agreement has been completed and scanned into the chart.  The patient understands that they will need to be seen every 90 days and that [place] will be queried and evaluated for inconsistencies at 90 days intervals or more frequently as indicated.    Subjective   HPI  53 y.o. male here for follow up.  Recently he has been having difficulty with cough and congestion.  He is sure it is from his CPAP machine.  He feels like he has a lot of postnasal drainage over the past few weeks and it settles in his throat and he just cannot get him up.  He does have difficulty with this and he would wheeze and the albuterol inhaler was always helpful.    Review of Systems   Constitutional:  Negative for fever.   Respiratory:  Negative for shortness of breath.    Cardiovascular:  Negative for chest pain.   Gastrointestinal:  Negative for abdominal pain.   All other systems reviewed and are negative.     Objective   There were no vitals filed for this visit.   There is no height or weight on file to calculate BMI.  Physical Exam  Vitals (Patient-reported vitals.) reviewed.   Constitutional:        "Comments: This is a virtual / telehealth encounter; unable to perform physical exam.       Diagnostic Results   Lab Results   Component Value Date    GLUCOSE 126 (H) 12/19/2022    CALCIUM 9.3 12/19/2022     12/19/2022    K 4.0 12/19/2022    CO2 28 12/19/2022    CL 98 12/19/2022    BUN 8 12/19/2022    CREATININE 0.7 12/19/2022     Lab Results   Component Value Date    ALT 27 12/19/2022    AST 27 12/19/2022    ALKPHOS 84 12/19/2022    BILITOT 0.3 12/19/2022     Lab Results   Component Value Date    WBC 6.4 05/20/2021    HGB 15.1 05/20/2021    HCT 44.7 05/20/2021    MCV 88.5 05/20/2021     05/20/2021     Lab Results   Component Value Date    CHOL 196 12/19/2022    CHOL 211 (H) 09/24/2020    CHOL 199 09/30/2019     Lab Results   Component Value Date    HDL 36 (L) 12/19/2022    HDL 36 (L) 09/24/2020    HDL 40 (L) 09/30/2019     Lab Results   Component Value Date    LDLCALC 81 12/19/2022    LDLCALC 144 (H) 09/24/2020    LDLCALC  09/30/2019     Unable to report calculated LDL due to increased triglycerides. Direct     Lab Results   Component Value Date    TRIG 397 (H) 12/19/2022    TRIG 156 (H) 09/24/2020    TRIG 492 (H) 09/30/2019     No components found for: \"CHOLHDL\"  Lab Results   Component Value Date    HGBA1C 6.4 02/29/2024     Other labs not included in the list above were reviewed either before or during this encounter.    History    Past Medical History:   Diagnosis Date    Personal history of other endocrine, nutritional and metabolic disease     History of diabetes mellitus     Past Surgical History:   Procedure Laterality Date    CT AORTA AND BILATERAL ILIOFEMORAL RUNOFF ANGIOGRAM W AND/OR WO IV CONTRAST  12/6/2017    CT AORTA AND BILATERAL ILIOFEMORAL RUNOFF ANGIOGRAM W AND/OR WO IV CONTRAST 12/6/2017 [facility] INPATIENT LEGACY    OTHER SURGICAL HISTORY  05/29/2013    Leg Repair    OTHER SURGICAL HISTORY  01/30/2018    Amputation Of Leg Below Knee     Family History   Adopted: Yes     Social History " "    Socioeconomic History    Marital status:      Spouse name: Not on file    Number of children: Not on file    Years of education: Not on file    Highest education level: Not on file   Occupational History    Not on file   Tobacco Use    Smoking status: Former     Types: Cigarettes    Smokeless tobacco: Never   Vaping Use    Vaping status: Never Used   Substance and Sexual Activity    Alcohol use: Never    Drug use: Yes     Comment: medical marijuana card    Sexual activity: Not on file   Other Topics Concern    Not on file   Social History Narrative    Not on file     Social Determinants of Health     Financial Resource Strain: Not at Risk (2022)    Received from [employer]     Financial Resource Strain     Financial Resource Strain: 1   Food Insecurity: Not on File (2021)    Received from [employer]     Food Insecurity     Food: 0   Transportation Needs: Not at Risk (2022)    Received from [employer]     Transportation Needs     Transportation: 1   Physical Activity: Not on File (2021)    Received from [employer]     Physical Activity     Physical Activity: 0   Stress: Not at Risk (2022)    Received from [employer]     Stress     Stress: 1   Social Connections: Not at Risk (2022)    Received from [employer]     Social Connections     Social Connections and Isolation: 1   Intimate Partner Violence: Not on file   Housing Stability: Not at Risk (2022)    Received from [employer]     Housing Stability     Housin     Allergies   Allergen Reactions    Atorvastatin Hives, Shortness of breath and Other     Facial edema   Resp distress    Fenofibrate Nanocrystallized Shortness of breath and Other     Facial edema   Resp distress    Insulin Glargine Other and Shortness of breath    Moxifloxacin Shortness of breath and Rash     Racing heart  splotches    Tramadol Shortness of breath    Other Other     \"All Antihypertensives\"  Throat swelling  Passed out      Pravastatin Unknown    Apixaban Rash     Racing heart " "    Current Outpatient Medications on File Prior to Visit   Medication Sig Dispense Refill    ALPRAZolam (Xanax) 1 mg tablet Take 1 tablet (1 mg) by mouth every 8 hours if needed for anxiety. 90 tablet 0    amitriptyline (Elavil) 50 mg tablet TAKE 1 TABLET(50 MG) BY MOUTH DAILY AT BEDTIME 90 tablet 3    aspirin 81 mg chewable tablet Chew 1 tablet (81 mg) once daily.      blood sugar diagnostic (Accu-Chek Guide test strips) strip USE AS DIRECTED FOR TESTING BLOOD GLUCOSE TWICE DAILY      fluticasone (Flonase) 50 mcg/actuation nasal spray Administer 1 spray into each nostril once daily. Shake liquid      gabapentin (Neurontin) 300 mg capsule Take 2 capsules (600 mg) by mouth 3 times a day.      insulin glargine (Lantus Solostar U-100 Insulin) 100 unit/mL (3 mL) pen Inject 60 Units under the skin once daily in the morning. 45 mL 3    lidocaine (Lidoderm) 5 % patch Place 1 patch on the skin once daily. remove after 12 hours      linaCLOtide (Linzess) 145 mcg capsule Take 1 capsule (145 mcg) by mouth once daily in the morning. Take before meals.      lisinopril 10 mg tablet Take 1 tablet (10 mg) by mouth once daily. 30 tablet 11    medical cannabis Medical Marijuana      metFORMIN (Glucophage) 1,000 mg tablet Take 1 tablet (1,000 mg) by mouth 2 times a day with meals.      naloxone (Narcan) 4 mg/0.1 mL nasal spray Administer 1 spray (4 mg) into affected nostril(s) if needed for opioid reversal. May repeat every 2-3 minutes if needed, alternating nostrils, until medical assistance becomes available. 2 each 0    NovoLOG Flexpen U-100 Insulin 100 unit/mL (3 mL) pen INJECT 20 UNITS UNDER THE SKIN THREE TIMES DAILY UP TO 60 UNITS PER DAY 54 mL 3    oxyCODONE-acetaminophen (Percocet)  mg tablet Take 1 tablet by mouth every 6 hours if needed for severe pain (7 - 10). Do not start before April 30, 2024. 120 tablet 0    pen needle 1/2\" (BD Ultra-Fine Orig Pen Needle) 29G X 12mm needle every 6 hours. 1 subcutaneously four " "times a day      pen needle, diabetic (BD Ultra-Fine Delilah Pen Needle) 32 gauge x 5/32\" needle USE DAILY AS DIRECTED 100 each 11    tiZANidine (Zanaflex) 4 mg tablet TAKE 1 TABLET BY MOUTH EVERY 4 HOURS AS NEEDED 540 tablet 0    traZODone (Desyrel) 100 mg tablet Take 1 tablet (100 mg) by mouth once daily at bedtime. 90 tablet 1     No current facility-administered medications on file prior to visit.     Immunization History   Administered Date(s) Administered    Flu vaccine (IIV4), preservative free *Check age/dose* 10/18/2013    Influenza, Unspecified 12/02/2009, 10/26/2010, 11/14/2012    Influenza, seasonal, injectable 10/16/2017    Influenza, seasonal, injectable, preservative free 10/03/2016    Pneumococcal polysaccharide vaccine, 23-valent, age 2 years and older (PNEUMOVAX 23) 12/02/2009    Tdap vaccine, age 7 year and older (BOOSTRIX, ADACEL) 12/20/2019     Patient's medical history was reviewed and updated either before or during this encounter.       Chas Cordova MD  "

## 2024-05-10 ENCOUNTER — TELEMEDICINE (OUTPATIENT)
Dept: PRIMARY CARE | Facility: CLINIC | Age: 54
End: 2024-05-10
Payer: MEDICARE

## 2024-05-10 DIAGNOSIS — J40 BRONCHITIS: ICD-10-CM

## 2024-05-10 DIAGNOSIS — Z79.4 TYPE 2 DIABETES MELLITUS WITHOUT COMPLICATION, WITH LONG-TERM CURRENT USE OF INSULIN (MULTI): ICD-10-CM

## 2024-05-10 DIAGNOSIS — E55.9 VITAMIN D DEFICIENCY: ICD-10-CM

## 2024-05-10 DIAGNOSIS — E11.40 TYPE 2 DIABETES MELLITUS WITH DIABETIC NEUROPATHY, WITH LONG-TERM CURRENT USE OF INSULIN (MULTI): Primary | ICD-10-CM

## 2024-05-10 DIAGNOSIS — I73.9 PERIPHERAL VASCULAR DISEASE (CMS-HCC): ICD-10-CM

## 2024-05-10 DIAGNOSIS — Z79.4 TYPE 2 DIABETES MELLITUS WITH DIABETIC NEUROPATHY, WITH LONG-TERM CURRENT USE OF INSULIN (MULTI): Primary | ICD-10-CM

## 2024-05-10 DIAGNOSIS — F41.9 ANXIETY: ICD-10-CM

## 2024-05-10 DIAGNOSIS — Z12.5 ENCOUNTER FOR PROSTATE CANCER SCREENING: ICD-10-CM

## 2024-05-10 DIAGNOSIS — I10 PRIMARY HYPERTENSION: ICD-10-CM

## 2024-05-10 DIAGNOSIS — Z01.89 ENCOUNTER FOR ROUTINE LABORATORY TESTING: ICD-10-CM

## 2024-05-10 DIAGNOSIS — G47.33 OSA (OBSTRUCTIVE SLEEP APNEA): ICD-10-CM

## 2024-05-10 DIAGNOSIS — E11.9 TYPE 2 DIABETES MELLITUS WITHOUT COMPLICATION, WITH LONG-TERM CURRENT USE OF INSULIN (MULTI): ICD-10-CM

## 2024-05-10 DIAGNOSIS — M48.02 CERVICAL SPINAL STENOSIS: ICD-10-CM

## 2024-05-10 PROCEDURE — 99443 PR PHYS/QHP TELEPHONE EVALUATION 21-30 MIN: CPT | Performed by: INTERNAL MEDICINE

## 2024-05-10 PROCEDURE — 4010F ACE/ARB THERAPY RXD/TAKEN: CPT | Performed by: INTERNAL MEDICINE

## 2024-05-10 RX ORDER — DOXYCYCLINE 100 MG/1
100 CAPSULE ORAL 2 TIMES DAILY
Qty: 14 CAPSULE | Refills: 0 | Status: SHIPPED | OUTPATIENT
Start: 2024-05-10 | End: 2024-05-17

## 2024-05-10 RX ORDER — ALBUTEROL SULFATE 90 UG/1
2 AEROSOL, METERED RESPIRATORY (INHALATION) EVERY 4 HOURS PRN
Qty: 8.5 G | Refills: 0 | Status: SHIPPED | OUTPATIENT
Start: 2024-05-10 | End: 2025-05-10

## 2024-05-10 RX ORDER — ALPRAZOLAM 1 MG/1
1 TABLET ORAL EVERY 8 HOURS PRN
Qty: 90 TABLET | Refills: 2 | Status: SHIPPED | OUTPATIENT
Start: 2024-05-10

## 2024-05-13 RX ORDER — FLUTICASONE PROPIONATE 50 MCG
1 SPRAY, SUSPENSION (ML) NASAL DAILY
Qty: 16 G | Refills: 11 | Status: SHIPPED | OUTPATIENT
Start: 2024-05-13

## 2024-05-23 ENCOUNTER — OFFICE VISIT (OUTPATIENT)
Dept: PAIN MEDICINE | Facility: CLINIC | Age: 54
End: 2024-05-23
Payer: MEDICARE

## 2024-05-23 VITALS
WEIGHT: 262 LBS | BODY MASS INDEX: 33.62 KG/M2 | DIASTOLIC BLOOD PRESSURE: 70 MMHG | SYSTOLIC BLOOD PRESSURE: 126 MMHG | HEIGHT: 74 IN | HEART RATE: 96 BPM | OXYGEN SATURATION: 97 %

## 2024-05-23 DIAGNOSIS — M96.1 CERVICAL POSTLAMINECTOMY SYNDROME: Primary | ICD-10-CM

## 2024-05-23 DIAGNOSIS — M96.1 POSTLAMINECTOMY SYNDROME, CERVICAL REGION: ICD-10-CM

## 2024-05-23 DIAGNOSIS — G89.29 OTHER CHRONIC PAIN: ICD-10-CM

## 2024-05-23 DIAGNOSIS — E11.40 PAINFUL DIABETIC NEUROPATHY (MULTI): ICD-10-CM

## 2024-05-23 PROCEDURE — 3074F SYST BP LT 130 MM HG: CPT | Performed by: NURSE PRACTITIONER

## 2024-05-23 PROCEDURE — 4010F ACE/ARB THERAPY RXD/TAKEN: CPT | Performed by: NURSE PRACTITIONER

## 2024-05-23 PROCEDURE — 99214 OFFICE O/P EST MOD 30 MIN: CPT | Performed by: NURSE PRACTITIONER

## 2024-05-23 PROCEDURE — 1036F TOBACCO NON-USER: CPT | Performed by: NURSE PRACTITIONER

## 2024-05-23 PROCEDURE — 3078F DIAST BP <80 MM HG: CPT | Performed by: NURSE PRACTITIONER

## 2024-05-23 RX ORDER — OXYCODONE AND ACETAMINOPHEN 10; 325 MG/1; MG/1
1 TABLET ORAL EVERY 6 HOURS PRN
Qty: 120 TABLET | Refills: 0 | Status: SHIPPED | OUTPATIENT
Start: 2024-06-29 | End: 2024-07-29

## 2024-05-23 RX ORDER — OXYCODONE AND ACETAMINOPHEN 10; 325 MG/1; MG/1
1 TABLET ORAL EVERY 6 HOURS PRN
Qty: 120 TABLET | Refills: 0 | Status: SHIPPED | OUTPATIENT
Start: 2024-05-30 | End: 2024-06-29

## 2024-05-23 ASSESSMENT — PAIN - FUNCTIONAL ASSESSMENT: PAIN_FUNCTIONAL_ASSESSMENT: 0-10

## 2024-05-23 ASSESSMENT — PAIN DESCRIPTION - DESCRIPTORS: DESCRIPTORS: ACHING;SHARP

## 2024-05-23 ASSESSMENT — PAIN SCALES - GENERAL
PAINLEVEL_OUTOF10: 9
PAINLEVEL: 9
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
## 2024-05-23 NOTE — PROGRESS NOTES
MEDICATION NAME: Oxycodone  STRENGTH: 10-325mg  LAST FILL DATE: 24  DATE LAST TAKEN: 24  QUANTITY FILLED: 120  QUANTITY REMAININ  COUNT COMPLETED BY: BABAK MOODY MA and REYNALDO MORALES MD      UDS LAST COMPLETED:   CONTROLLED SUBSTANCES AGREEMENT LAST SIGNED:   ORT LAST COMPLETED:  Modified [employer] disability form filled out annually.

## 2024-05-30 ENCOUNTER — APPOINTMENT (OUTPATIENT)
Dept: PAIN MEDICINE | Facility: CLINIC | Age: 54
End: 2024-05-30
Payer: MEDICARE

## 2024-05-30 ENCOUNTER — PHARMACY VISIT (OUTPATIENT)
Dept: PHARMACY | Facility: CLINIC | Age: 54
End: 2024-05-30
Payer: MEDICARE

## 2024-05-30 PROCEDURE — RXOTC WILLOW AMBULATORY OTC CHARGE

## 2024-05-30 PROCEDURE — RXMED WILLOW AMBULATORY MEDICATION CHARGE

## 2024-06-07 DIAGNOSIS — F51.01 PRIMARY INSOMNIA: ICD-10-CM

## 2024-06-07 PROCEDURE — RXMED WILLOW AMBULATORY MEDICATION CHARGE

## 2024-06-07 RX ORDER — TRAZODONE HYDROCHLORIDE 100 MG/1
100 TABLET ORAL NIGHTLY
Qty: 90 TABLET | Refills: 1 | Status: SHIPPED | OUTPATIENT
Start: 2024-06-07 | End: 2024-12-04

## 2024-06-09 DIAGNOSIS — M48.02 CERVICAL SPINAL STENOSIS: ICD-10-CM

## 2024-06-10 PROCEDURE — RXMED WILLOW AMBULATORY MEDICATION CHARGE

## 2024-06-10 RX ORDER — TIZANIDINE 4 MG/1
4 TABLET ORAL EVERY 4 HOURS PRN
Qty: 540 TABLET | Refills: 3 | Status: SHIPPED | OUTPATIENT
Start: 2024-06-10

## 2024-06-10 RX ORDER — METFORMIN HYDROCHLORIDE 1000 MG/1
1000 TABLET ORAL
Qty: 180 TABLET | Refills: 3 | Status: SHIPPED | OUTPATIENT
Start: 2024-06-10

## 2024-06-11 ENCOUNTER — PHARMACY VISIT (OUTPATIENT)
Dept: PHARMACY | Facility: CLINIC | Age: 54
End: 2024-06-11
Payer: MEDICARE

## 2024-06-28 PROCEDURE — RXMED WILLOW AMBULATORY MEDICATION CHARGE

## 2024-06-29 ENCOUNTER — PHARMACY VISIT (OUTPATIENT)
Dept: PHARMACY | Facility: CLINIC | Age: 54
End: 2024-06-29
Payer: MEDICARE

## 2024-06-29 PROCEDURE — RXMED WILLOW AMBULATORY MEDICATION CHARGE

## 2024-07-07 DIAGNOSIS — F41.9 ANXIETY: ICD-10-CM

## 2024-07-08 DIAGNOSIS — Z79.4 TYPE 2 DIABETES MELLITUS WITH DIABETIC NEUROPATHY, WITH LONG-TERM CURRENT USE OF INSULIN (MULTI): ICD-10-CM

## 2024-07-08 DIAGNOSIS — E11.40 TYPE 2 DIABETES MELLITUS WITH DIABETIC NEUROPATHY, WITH LONG-TERM CURRENT USE OF INSULIN (MULTI): ICD-10-CM

## 2024-07-08 PROCEDURE — RXMED WILLOW AMBULATORY MEDICATION CHARGE

## 2024-07-08 RX ORDER — INSULIN ASPART 100 [IU]/ML
20 INJECTION, SOLUTION INTRAVENOUS; SUBCUTANEOUS
Qty: 15 ML | Refills: 11 | Status: SHIPPED | OUTPATIENT
Start: 2024-07-08 | End: 2025-07-08

## 2024-07-08 RX ORDER — ALPRAZOLAM 1 MG/1
1 TABLET ORAL EVERY 8 HOURS PRN
Qty: 90 TABLET | Refills: 0 | Status: SHIPPED | OUTPATIENT
Start: 2024-07-08

## 2024-07-10 ENCOUNTER — PHARMACY VISIT (OUTPATIENT)
Dept: PHARMACY | Facility: CLINIC | Age: 54
End: 2024-07-10
Payer: MEDICARE

## 2024-07-16 ENCOUNTER — OFFICE VISIT (OUTPATIENT)
Dept: PAIN MEDICINE | Facility: CLINIC | Age: 54
End: 2024-07-16
Payer: MEDICARE

## 2024-07-16 VITALS
WEIGHT: 262 LBS | BODY MASS INDEX: 33.62 KG/M2 | HEART RATE: 85 BPM | DIASTOLIC BLOOD PRESSURE: 89 MMHG | RESPIRATION RATE: 18 BRPM | HEIGHT: 74 IN | SYSTOLIC BLOOD PRESSURE: 137 MMHG

## 2024-07-16 DIAGNOSIS — G89.29 OTHER CHRONIC PAIN: ICD-10-CM

## 2024-07-16 DIAGNOSIS — M96.1 CERVICAL POSTLAMINECTOMY SYNDROME: ICD-10-CM

## 2024-07-16 DIAGNOSIS — M79.672 LEFT FOOT PAIN: Primary | ICD-10-CM

## 2024-07-16 DIAGNOSIS — M96.1 POSTLAMINECTOMY SYNDROME, CERVICAL REGION: ICD-10-CM

## 2024-07-16 DIAGNOSIS — E11.40 PAINFUL DIABETIC NEUROPATHY (MULTI): ICD-10-CM

## 2024-07-16 PROCEDURE — 99214 OFFICE O/P EST MOD 30 MIN: CPT | Performed by: ANESTHESIOLOGY

## 2024-07-16 PROCEDURE — 3075F SYST BP GE 130 - 139MM HG: CPT | Performed by: ANESTHESIOLOGY

## 2024-07-16 PROCEDURE — 4010F ACE/ARB THERAPY RXD/TAKEN: CPT | Performed by: ANESTHESIOLOGY

## 2024-07-16 PROCEDURE — 1036F TOBACCO NON-USER: CPT | Performed by: ANESTHESIOLOGY

## 2024-07-16 PROCEDURE — 3079F DIAST BP 80-89 MM HG: CPT | Performed by: ANESTHESIOLOGY

## 2024-07-16 RX ORDER — OXYCODONE AND ACETAMINOPHEN 10; 325 MG/1; MG/1
1 TABLET ORAL EVERY 6 HOURS PRN
Qty: 120 TABLET | Refills: 0 | Status: SHIPPED | OUTPATIENT
Start: 2024-08-26 | End: 2024-09-25

## 2024-07-16 RX ORDER — OXYCODONE AND ACETAMINOPHEN 10; 325 MG/1; MG/1
1 TABLET ORAL EVERY 6 HOURS PRN
Qty: 120 TABLET | Refills: 0 | Status: SHIPPED | OUTPATIENT
Start: 2024-07-27 | End: 2024-08-26

## 2024-07-16 ASSESSMENT — ENCOUNTER SYMPTOMS
HEMATOLOGIC/LYMPHATIC NEGATIVE: 1
WEAKNESS: 1
EYES NEGATIVE: 1
CARDIOVASCULAR NEGATIVE: 1
NUMBNESS: 1
ALLERGIC/IMMUNOLOGIC NEGATIVE: 1
NECK PAIN: 1
ENDOCRINE NEGATIVE: 1
CONSTITUTIONAL NEGATIVE: 1
PSYCHIATRIC NEGATIVE: 1
GASTROINTESTINAL NEGATIVE: 1
RESPIRATORY NEGATIVE: 1
BACK PAIN: 1
MYALGIAS: 1

## 2024-07-16 ASSESSMENT — PAIN SCALES - GENERAL
PAINLEVEL: 9
PAINLEVEL_OUTOF10: 9

## 2024-07-16 ASSESSMENT — PAIN DESCRIPTION - DESCRIPTORS: DESCRIPTORS: ACHING;SHARP

## 2024-07-16 ASSESSMENT — PAIN - FUNCTIONAL ASSESSMENT: PAIN_FUNCTIONAL_ASSESSMENT: 0-10

## 2024-07-16 ASSESSMENT — PATIENT HEALTH QUESTIONNAIRE - PHQ9
1. LITTLE INTEREST OR PLEASURE IN DOING THINGS: NOT AT ALL
2. FEELING DOWN, DEPRESSED OR HOPELESS: NOT AT ALL
SUM OF ALL RESPONSES TO PHQ9 QUESTIONS 1 AND 2: 0

## 2024-07-16 NOTE — PROGRESS NOTES
MEDICATION NAME: Percocet   STRENGTH: 10/325mg  LAST FILL DATE: 24  DATE LAST TAKEN: 24  QUANTITY FILLED: 120  QUANTITY REMAININ  COUNT COMPLETED BY: DAVIDA GAINES RN  and IMELDA Padilla      UDS LAST COMPLETED:   CONTROLLED SUBSTANCES AGREEMENT LAST SIGNED:   ORT LAST COMPLETED:  Modified [employer] disability form filled out annually.
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
## 2024-07-26 PROCEDURE — RXMED WILLOW AMBULATORY MEDICATION CHARGE

## 2024-07-27 ENCOUNTER — PHARMACY VISIT (OUTPATIENT)
Dept: PHARMACY | Facility: CLINIC | Age: 54
End: 2024-07-27
Payer: MEDICARE

## 2024-07-27 PROCEDURE — RXMED WILLOW AMBULATORY MEDICATION CHARGE

## 2024-08-07 ENCOUNTER — TELEMEDICINE (OUTPATIENT)
Dept: PRIMARY CARE | Facility: CLINIC | Age: 54
End: 2024-08-07
Payer: MEDICARE

## 2024-08-07 DIAGNOSIS — Z79.899 LONG-TERM CURRENT USE OF BENZODIAZEPINE: Primary | ICD-10-CM

## 2024-08-07 DIAGNOSIS — Z12.5 ENCOUNTER FOR PROSTATE CANCER SCREENING: ICD-10-CM

## 2024-08-07 DIAGNOSIS — I10 PRIMARY HYPERTENSION: ICD-10-CM

## 2024-08-07 DIAGNOSIS — E78.2 MIXED HYPERLIPIDEMIA: ICD-10-CM

## 2024-08-07 DIAGNOSIS — E11.40 TYPE 2 DIABETES MELLITUS WITH DIABETIC NEUROPATHY, WITH LONG-TERM CURRENT USE OF INSULIN (MULTI): ICD-10-CM

## 2024-08-07 DIAGNOSIS — Z12.2 ENCOUNTER FOR SCREENING FOR MALIGNANT NEOPLASM OF LUNG: ICD-10-CM

## 2024-08-07 DIAGNOSIS — F41.9 ANXIETY: ICD-10-CM

## 2024-08-07 DIAGNOSIS — F17.211 NICOTINE DEPENDENCE, CIGARETTES, IN REMISSION: ICD-10-CM

## 2024-08-07 DIAGNOSIS — R73.9 HYPERGLYCEMIA: ICD-10-CM

## 2024-08-07 DIAGNOSIS — M48.02 CERVICAL SPINAL STENOSIS: ICD-10-CM

## 2024-08-07 DIAGNOSIS — F11.20 UNCOMPLICATED OPIOID DEPENDENCE (MULTI): ICD-10-CM

## 2024-08-07 DIAGNOSIS — Z79.4 TYPE 2 DIABETES MELLITUS WITH DIABETIC NEUROPATHY, WITH LONG-TERM CURRENT USE OF INSULIN (MULTI): ICD-10-CM

## 2024-08-07 PROCEDURE — 4010F ACE/ARB THERAPY RXD/TAKEN: CPT | Performed by: INTERNAL MEDICINE

## 2024-08-07 PROCEDURE — 1036F TOBACCO NON-USER: CPT | Performed by: INTERNAL MEDICINE

## 2024-08-07 PROCEDURE — 99442 PR PHYS/QHP TELEPHONE EVALUATION 11-20 MIN: CPT | Performed by: INTERNAL MEDICINE

## 2024-08-07 RX ORDER — INSULIN GLARGINE 100 [IU]/ML
60 INJECTION, SOLUTION SUBCUTANEOUS EVERY MORNING
Qty: 45 ML | Refills: 3 | Status: SHIPPED | OUTPATIENT
Start: 2024-08-07

## 2024-08-07 RX ORDER — ALPRAZOLAM 1 MG/1
1 TABLET ORAL EVERY 8 HOURS PRN
Qty: 90 TABLET | Refills: 2 | Status: SHIPPED | OUTPATIENT
Start: 2024-08-07

## 2024-08-07 NOTE — PROGRESS NOTES
[facility]: [facility]  TELEHEALTH ENCOUNTER      Geoffrey Dan is a 53 y.o. male that is presenting today for Follow-up.  This is a telehealth encounter with audio technology. Patient has consented to this type of visit. Duration of encounter: 11 minutes.    Assessment/Plan   Diagnoses and all orders for this visit:  Long-term current use of benzodiazepine  Uncomplicated opioid dependence (Multi)  Cervical spinal stenosis  Type 2 diabetes mellitus with diabetic neuropathy, with long-term current use of insulin (Multi)  Primary hypertension    Stable use of bzd for years.   Stable use of opioid as per pan mgmt.  No adverse effects such as sedation, constipation noted at this time.    Would like to obtain CT lung screening given his smoking history.    Laboratory studies ordered again, he agrees to get them done this time.    Subjective   HPI  This is a virtual visit to discuss chronic use of the controlled substance alprazolam.  With the medication, anxiety has been under reasonable control and there are no adverse effects noted.  Quality of life and functional status are improved because of the medication.   He does follow closely w/ pain mgmt as well, last saw Dr. Miranda 7/16/24.  Stable use of oxycodone.    He is concerned with his exposure to CPAP therapy that had been recalled.  He does intermittently have upper respiratory symptoms with cough, etc.  Has been treated with antibiotics intermittently.  We reviewed his smoking history.  Decided it would be a good idea to obtain CT lung screening.  He also has not had the blood work updated although it was ordered twice and he agrees to do so now.  He feels that the glycemic control is good at this point.    Review of Systems   Objective   There were no vitals filed for this visit.  There is no height or weight on file to calculate BMI.  Physical Exam  Diagnostic Results   Lab Results   Component Value Date    GLUCOSE 126 (H) 12/19/2022     "CALCIUM 9.3 12/19/2022     12/19/2022    K 4.0 12/19/2022    CO2 28 12/19/2022    CL 98 12/19/2022    BUN 8 12/19/2022    CREATININE 0.7 12/19/2022     Lab Results   Component Value Date    ALT 27 12/19/2022    AST 27 12/19/2022    ALKPHOS 84 12/19/2022    BILITOT 0.3 12/19/2022     Lab Results   Component Value Date    WBC 6.4 05/20/2021    HGB 15.1 05/20/2021    HCT 44.7 05/20/2021    MCV 88.5 05/20/2021     05/20/2021     Lab Results   Component Value Date    CHOL 196 12/19/2022    CHOL 211 (H) 09/24/2020    CHOL 199 09/30/2019     Lab Results   Component Value Date    HDL 36 (L) 12/19/2022    HDL 36 (L) 09/24/2020    HDL 40 (L) 09/30/2019     Lab Results   Component Value Date    LDLCALC 81 12/19/2022    LDLCALC 144 (H) 09/24/2020    LDLCALC  09/30/2019     Unable to report calculated LDL due to increased triglycerides. Direct     Lab Results   Component Value Date    TRIG 397 (H) 12/19/2022    TRIG 156 (H) 09/24/2020    TRIG 492 (H) 09/30/2019     No components found for: \"CHOLHDL\"  Lab Results   Component Value Date    HGBA1C 6.4 02/29/2024     Other labs not included in the list above were reviewed either before or during this encounter.    History   Past Medical History:   Diagnosis Date    Personal history of other endocrine, nutritional and metabolic disease     History of diabetes mellitus     Past Surgical History:   Procedure Laterality Date    CT AORTA AND BILATERAL ILIOFEMORAL RUNOFF ANGIOGRAM W AND/OR WO IV CONTRAST  12/6/2017    CT AORTA AND BILATERAL ILIOFEMORAL RUNOFF ANGIOGRAM W AND/OR WO IV CONTRAST 12/6/2017 [facility]    OTHER SURGICAL HISTORY  05/29/2013    Leg Repair    OTHER SURGICAL HISTORY  01/30/2018    Amputation Of Leg Below Knee     Family History   Adopted: Yes     Social History     Socioeconomic History    Marital status:      Spouse name: Not on file    Number of children: Not on file    Years of education: Not on file    Highest education level: Not " "on file   Occupational History    Not on file   Tobacco Use    Smoking status: Former     Types: Cigarettes    Smokeless tobacco: Never   Vaping Use    Vaping status: Never Used   Substance and Sexual Activity    Alcohol use: Never    Drug use: Yes     Comment: medical marijuana card    Sexual activity: Not on file   Other Topics Concern    Not on file   Social History Narrative    Not on file     Social Determinants of Health     Financial Resource Strain: Not on File (2022)    Received from [employer]    Financial Resource Strain     Financial Resource Strain: 0   Food Insecurity: Not on File (2021)    Received from [employer] [facility]    Food Insecurity     Food: 0   Transportation Needs: Not on File (2022)    Received from [employer]    Transportation Needs     Transportation: 0   Physical Activity: Not on File (2021)    Received from [facility]    Physical Activity     Physical Activity: 0   Stress: Not on File (2022)    Received from [employer]    Stress     Stress: 0   Social Connections: Not at Risk (2022)    Received from [employer] [facility]    Social Connections     Social Connections and Isolation: 1   Intimate Partner Violence: Not on file   Housing Stability: Not on File (2022)    Received from [employer]    Housing Stability     Housin     Allergies   Allergen Reactions    Atorvastatin Hives, Shortness of breath and Other     Facial edema   Resp distress    Fenofibrate Nanocrystallized Shortness of breath and Other     Facial edema   Resp distress    Insulin Glargine Other and Shortness of breath    Moxifloxacin Shortness of breath and Rash     Racing heart  splotches    Tramadol Shortness of breath    Other Other     \"All Antihypertensives\"  Throat swelling  Passed out      Pravastatin Unknown    Apixaban Rash     Racing heart     Current Outpatient Medications on File Prior to Visit   Medication Sig Dispense Refill    albuterol (ProAir HFA) 90 mcg/actuation inhaler Inhale 2 puffs every 4 hours " if needed for wheezing or shortness of breath. 8.5 g 0    ALPRAZolam (Xanax) 1 mg tablet TAKE 1 TABLET BY MOUTH EVERY 8 HOURS AS NEEDED 90 tablet 0    amitriptyline (Elavil) 50 mg tablet TAKE 1 TABLET(50 MG) BY MOUTH DAILY AT BEDTIME 90 tablet 3    aspirin 81 mg chewable tablet Chew 1 tablet (81 mg) once daily.      blood sugar diagnostic (Accu-Chek Guide test strips) strip USE AS DIRECTED FOR TESTING BLOOD GLUCOSE TWICE DAILY      fluticasone (Flonase) 50 mcg/actuation nasal spray SHAKE LIQUID AND USE 1 SPRAY IN EACH NOSTRIL EVERY DAY 16 g 11    gabapentin (Neurontin) 300 mg capsule Take 2 capsules (600 mg) by mouth 3 times a day.      insulin aspart (NovoLOG Flexpen U-100 Insulin) 100 unit/mL (3 mL) pen Inject 20 Units under the skin 3 times a day before meals 15 mL 11    insulin glargine (Lantus Solostar U-100 Insulin) 100 unit/mL (3 mL) pen Inject 60 Units under the skin once daily in the morning. 45 mL 3    lidocaine (Lidoderm) 5 % patch Place 1 patch on the skin once daily. remove after 12 hours      linaCLOtide (Linzess) 145 mcg capsule Take 1 capsule (145 mcg) by mouth once daily in the morning. Take before meals.      lisinopril 10 mg tablet Take 1 tablet (10 mg) by mouth once daily. 30 tablet 11    medical cannabis Medical Marijuana      metFORMIN (Glucophage) 1,000 mg tablet TAKE 1 TABLET BY MOUTH TWICE DAILY WITH MEALS 180 tablet 3    naloxone (Narcan) 4 mg/0.1 mL nasal spray Administer 1 spray (4 mg) into affected nostril(s) if needed for opioid reversal. May repeat every 2-3 minutes if needed, alternating nostrils, until medical assistance becomes available. 2 each 0    NovoLOG Flexpen U-100 Insulin 100 unit/mL (3 mL) pen INJECT 20 UNITS UNDER THE SKIN THREE TIMES DAILY UP TO 60 UNITS PER DAY 54 mL 3    oxyCODONE-acetaminophen (Percocet)  mg tablet Take 1 tablet by mouth every 6 hours if needed for severe pain (7 - 10). Do not fill before July 27, 2024. 120 tablet 0    [START ON 8/26/2024]  "oxyCODONE-acetaminophen (Percocet)  mg tablet Take 1 tablet by mouth every 6 hours if needed for severe pain (7 - 10). Do not fill before August 26, 2024. 120 tablet 0    pen needle 1/2\" (BD Ultra-Fine Orig Pen Needle) 29G X 12mm needle every 6 hours. 1 subcutaneously four times a day      pen needle, diabetic (BD Ultra-Fine Delilah Pen Needle) 32 gauge x 5/32\" needle USE DAILY AS DIRECTED 100 each 11    tiZANidine (Zanaflex) 4 mg tablet TAKE 1 TABLET BY MOUTH EVERY 4 HOURS AS NEEDED 540 tablet 3    traZODone (Desyrel) 100 mg tablet Take 1 tablet (100 mg) by mouth once daily at bedtime. 90 tablet 1     No current facility-administered medications on file prior to visit.     Immunization History   Administered Date(s) Administered    Flu vaccine (IIV4), preservative free *Check age/dose* 10/18/2013    Influenza, Unspecified 12/02/2009, 10/26/2010, 11/14/2012    Influenza, seasonal, injectable 10/16/2017    Influenza, seasonal, injectable, preservative free 10/03/2016    Pneumococcal polysaccharide vaccine, 23-valent, age 2 years and older (PNEUMOVAX 23) 12/02/2009    Tdap vaccine, age 7 year and older (BOOSTRIX, ADACEL) 12/20/2019     Patient's medical history was reviewed and updated either before or during this encounter.       Chas Cordova MD  "

## 2024-08-08 ENCOUNTER — LAB (OUTPATIENT)
Dept: LAB | Facility: LAB | Age: 54
End: 2024-08-08
Payer: MEDICARE

## 2024-08-08 DIAGNOSIS — E11.9 TYPE 2 DIABETES MELLITUS WITHOUT COMPLICATION, WITH LONG-TERM CURRENT USE OF INSULIN (MULTI): ICD-10-CM

## 2024-08-08 DIAGNOSIS — Z12.5 ENCOUNTER FOR PROSTATE CANCER SCREENING: ICD-10-CM

## 2024-08-08 DIAGNOSIS — Z01.89 ENCOUNTER FOR ROUTINE LABORATORY TESTING: ICD-10-CM

## 2024-08-08 DIAGNOSIS — E78.2 MIXED HYPERLIPIDEMIA: ICD-10-CM

## 2024-08-08 DIAGNOSIS — E55.9 VITAMIN D DEFICIENCY: ICD-10-CM

## 2024-08-08 DIAGNOSIS — Z79.4 TYPE 2 DIABETES MELLITUS WITHOUT COMPLICATION, WITH LONG-TERM CURRENT USE OF INSULIN (MULTI): ICD-10-CM

## 2024-08-08 DIAGNOSIS — E11.40 TYPE 2 DIABETES MELLITUS WITH DIABETIC NEUROPATHY, WITH LONG-TERM CURRENT USE OF INSULIN (MULTI): ICD-10-CM

## 2024-08-08 DIAGNOSIS — Z79.4 TYPE 2 DIABETES MELLITUS WITH DIABETIC NEUROPATHY, WITH LONG-TERM CURRENT USE OF INSULIN (MULTI): ICD-10-CM

## 2024-08-08 DIAGNOSIS — R73.9 HYPERGLYCEMIA: ICD-10-CM

## 2024-08-08 LAB
ALBUMIN SERPL BCP-MCNC: 4.6 G/DL (ref 3.4–5)
ALP SERPL-CCNC: 67 U/L (ref 33–120)
ALT SERPL W P-5'-P-CCNC: 25 U/L (ref 10–52)
ANION GAP SERPL CALC-SCNC: 15 MMOL/L (ref 10–20)
AST SERPL W P-5'-P-CCNC: 22 U/L (ref 9–39)
BASOPHILS # BLD AUTO: 0.13 X10*3/UL (ref 0–0.1)
BASOPHILS NFR BLD AUTO: 1.6 %
BILIRUB SERPL-MCNC: 0.7 MG/DL (ref 0–1.2)
BUN SERPL-MCNC: 12 MG/DL (ref 6–23)
CALCIUM SERPL-MCNC: 9.7 MG/DL (ref 8.6–10.3)
CHLORIDE SERPL-SCNC: 97 MMOL/L (ref 98–107)
CHOLEST SERPL-MCNC: 196 MG/DL (ref 0–199)
CHOLESTEROL/HDL RATIO: 4
CO2 SERPL-SCNC: 28 MMOL/L (ref 21–32)
CREAT SERPL-MCNC: 0.83 MG/DL (ref 0.5–1.3)
EGFRCR SERPLBLD CKD-EPI 2021: >90 ML/MIN/1.73M*2
EOSINOPHIL # BLD AUTO: 0.33 X10*3/UL (ref 0–0.7)
EOSINOPHIL NFR BLD AUTO: 4.2 %
ERYTHROCYTE [DISTWIDTH] IN BLOOD BY AUTOMATED COUNT: 11.5 % (ref 11.5–14.5)
GLUCOSE SERPL-MCNC: 124 MG/DL (ref 74–99)
HCT VFR BLD AUTO: 47.5 % (ref 41–52)
HDLC SERPL-MCNC: 48.9 MG/DL
HGB BLD-MCNC: 16.1 G/DL (ref 13.5–17.5)
IMM GRANULOCYTES # BLD AUTO: 0.04 X10*3/UL (ref 0–0.7)
IMM GRANULOCYTES NFR BLD AUTO: 0.5 % (ref 0–0.9)
LDLC SERPL CALC-MCNC: 107 MG/DL
LYMPHOCYTES # BLD AUTO: 1.81 X10*3/UL (ref 1.2–4.8)
LYMPHOCYTES NFR BLD AUTO: 22.8 %
MCH RBC QN AUTO: 30.3 PG (ref 26–34)
MCHC RBC AUTO-ENTMCNC: 33.9 G/DL (ref 32–36)
MCV RBC AUTO: 90 FL (ref 80–100)
MONOCYTES # BLD AUTO: 0.71 X10*3/UL (ref 0.1–1)
MONOCYTES NFR BLD AUTO: 8.9 %
NEUTROPHILS # BLD AUTO: 4.92 X10*3/UL (ref 1.2–7.7)
NEUTROPHILS NFR BLD AUTO: 62 %
NON HDL CHOLESTEROL: 147 MG/DL (ref 0–149)
NRBC BLD-RTO: 0 /100 WBCS (ref 0–0)
PLATELET # BLD AUTO: 278 X10*3/UL (ref 150–450)
POTASSIUM SERPL-SCNC: 4.1 MMOL/L (ref 3.5–5.3)
PROT SERPL-MCNC: 7.6 G/DL (ref 6.4–8.2)
RBC # BLD AUTO: 5.31 X10*6/UL (ref 4.5–5.9)
SODIUM SERPL-SCNC: 136 MMOL/L (ref 136–145)
TRIGL SERPL-MCNC: 199 MG/DL (ref 0–149)
TSH SERPL-ACNC: 1.63 MIU/L (ref 0.44–3.98)
VLDL: 40 MG/DL (ref 0–40)
WBC # BLD AUTO: 7.9 X10*3/UL (ref 4.4–11.3)

## 2024-08-08 PROCEDURE — G0103 PSA SCREENING: HCPCS

## 2024-08-08 PROCEDURE — 83036 HEMOGLOBIN GLYCOSYLATED A1C: CPT

## 2024-08-08 PROCEDURE — 36415 COLL VENOUS BLD VENIPUNCTURE: CPT

## 2024-08-08 PROCEDURE — 82306 VITAMIN D 25 HYDROXY: CPT

## 2024-08-09 LAB
25(OH)D3 SERPL-MCNC: 57 NG/ML (ref 30–100)
EST. AVERAGE GLUCOSE BLD GHB EST-MCNC: 100 MG/DL
HBA1C MFR BLD: 5.1 %
PSA SERPL-MCNC: 0.24 NG/ML

## 2024-08-19 DIAGNOSIS — Z79.4 TYPE 2 DIABETES MELLITUS WITH DIABETIC NEUROPATHY, WITH LONG-TERM CURRENT USE OF INSULIN (MULTI): ICD-10-CM

## 2024-08-19 DIAGNOSIS — E11.40 TYPE 2 DIABETES MELLITUS WITH DIABETIC NEUROPATHY, WITH LONG-TERM CURRENT USE OF INSULIN (MULTI): ICD-10-CM

## 2024-08-19 PROCEDURE — RXMED WILLOW AMBULATORY MEDICATION CHARGE

## 2024-08-19 RX ORDER — INSULIN GLARGINE 100 [IU]/ML
60 INJECTION, SOLUTION SUBCUTANEOUS EVERY MORNING
Qty: 45 ML | Refills: 3 | Status: SHIPPED | OUTPATIENT
Start: 2024-08-19

## 2024-08-20 ENCOUNTER — HOSPITAL ENCOUNTER (OUTPATIENT)
Dept: RADIOLOGY | Facility: HOSPITAL | Age: 54
Discharge: HOME | End: 2024-08-20
Payer: MEDICARE

## 2024-08-20 DIAGNOSIS — F17.211 NICOTINE DEPENDENCE, CIGARETTES, IN REMISSION: ICD-10-CM

## 2024-08-20 DIAGNOSIS — Z12.2 ENCOUNTER FOR SCREENING FOR MALIGNANT NEOPLASM OF LUNG: ICD-10-CM

## 2024-08-20 PROCEDURE — 71271 CT THORAX LUNG CANCER SCR C-: CPT

## 2024-08-21 ENCOUNTER — PHARMACY VISIT (OUTPATIENT)
Dept: PHARMACY | Facility: CLINIC | Age: 54
End: 2024-08-21
Payer: MEDICARE

## 2024-08-21 ENCOUNTER — TELEPHONE (OUTPATIENT)
Dept: PRIMARY CARE | Facility: CLINIC | Age: 54
End: 2024-08-21
Payer: MEDICARE

## 2024-08-21 DIAGNOSIS — I25.10 CORONARY ARTERY DISEASE DUE TO LIPID RICH PLAQUE: Primary | ICD-10-CM

## 2024-08-21 DIAGNOSIS — I25.83 CORONARY ARTERY DISEASE DUE TO LIPID RICH PLAQUE: Primary | ICD-10-CM

## 2024-08-21 NOTE — TELEPHONE ENCOUNTER
----- Message from Chas Cordova sent at 8/20/2024  6:25 PM EDT -----  Please tell to him that the lungs look okay without any major abnormalities but there is a lot of plaque in the heart arteries.  I wonder if some of his symptoms could be from that and I think it would be a good idea for him to meet with a cardiologist.  I can place a referral if he is willing to go.

## 2024-08-22 NOTE — TELEPHONE ENCOUNTER
Spoke with pt, pt is willing to see a cardiologist.  Please put the referral in.     [phone]

## 2024-08-27 ENCOUNTER — PHARMACY VISIT (OUTPATIENT)
Dept: PHARMACY | Facility: CLINIC | Age: 54
End: 2024-08-27
Payer: MEDICARE

## 2024-08-27 PROCEDURE — RXMED WILLOW AMBULATORY MEDICATION CHARGE

## 2024-09-04 DIAGNOSIS — M48.02 CERVICAL SPINAL STENOSIS: Primary | ICD-10-CM

## 2024-09-04 PROCEDURE — RXMED WILLOW AMBULATORY MEDICATION CHARGE

## 2024-09-04 RX ORDER — GABAPENTIN 300 MG/1
600 CAPSULE ORAL 3 TIMES DAILY
Qty: 540 CAPSULE | Refills: 2 | Status: SHIPPED | OUTPATIENT
Start: 2024-09-04

## 2024-09-06 ENCOUNTER — PHARMACY VISIT (OUTPATIENT)
Dept: PHARMACY | Facility: CLINIC | Age: 54
End: 2024-09-06
Payer: MEDICARE

## 2024-09-17 ENCOUNTER — OFFICE VISIT (OUTPATIENT)
Dept: PAIN MEDICINE | Facility: CLINIC | Age: 54
End: 2024-09-17
Payer: MEDICARE

## 2024-09-17 VITALS
WEIGHT: 262 LBS | DIASTOLIC BLOOD PRESSURE: 88 MMHG | BODY MASS INDEX: 33.62 KG/M2 | SYSTOLIC BLOOD PRESSURE: 134 MMHG | RESPIRATION RATE: 16 BRPM | HEIGHT: 74 IN

## 2024-09-17 DIAGNOSIS — G89.29 OTHER CHRONIC PAIN: ICD-10-CM

## 2024-09-17 DIAGNOSIS — E11.40 PAINFUL DIABETIC NEUROPATHY (MULTI): Primary | ICD-10-CM

## 2024-09-17 DIAGNOSIS — M96.1 CERVICAL POSTLAMINECTOMY SYNDROME: ICD-10-CM

## 2024-09-17 DIAGNOSIS — Z89.9 STATUS POST AMPUTATION (HHS-HCC): ICD-10-CM

## 2024-09-17 DIAGNOSIS — M96.1 POSTLAMINECTOMY SYNDROME, CERVICAL REGION: ICD-10-CM

## 2024-09-17 PROCEDURE — 99214 OFFICE O/P EST MOD 30 MIN: CPT | Performed by: ANESTHESIOLOGY

## 2024-09-17 PROCEDURE — 3049F LDL-C 100-129 MG/DL: CPT | Performed by: ANESTHESIOLOGY

## 2024-09-17 PROCEDURE — 3075F SYST BP GE 130 - 139MM HG: CPT | Performed by: ANESTHESIOLOGY

## 2024-09-17 PROCEDURE — 3044F HG A1C LEVEL LT 7.0%: CPT | Performed by: ANESTHESIOLOGY

## 2024-09-17 PROCEDURE — 4010F ACE/ARB THERAPY RXD/TAKEN: CPT | Performed by: ANESTHESIOLOGY

## 2024-09-17 PROCEDURE — 3079F DIAST BP 80-89 MM HG: CPT | Performed by: ANESTHESIOLOGY

## 2024-09-17 PROCEDURE — 3008F BODY MASS INDEX DOCD: CPT | Performed by: ANESTHESIOLOGY

## 2024-09-17 RX ORDER — OXYCODONE AND ACETAMINOPHEN 10; 325 MG/1; MG/1
1 TABLET ORAL EVERY 6 HOURS PRN
Qty: 120 TABLET | Refills: 0 | Status: SHIPPED | OUTPATIENT
Start: 2024-10-25 | End: 2024-11-24

## 2024-09-17 RX ORDER — OXYCODONE AND ACETAMINOPHEN 10; 325 MG/1; MG/1
1 TABLET ORAL EVERY 6 HOURS PRN
Qty: 120 TABLET | Refills: 0 | Status: SHIPPED | OUTPATIENT
Start: 2024-09-26 | End: 2024-10-26

## 2024-09-17 ASSESSMENT — ENCOUNTER SYMPTOMS
RESPIRATORY NEGATIVE: 1
BACK PAIN: 1
PSYCHIATRIC NEGATIVE: 1
CONSTITUTIONAL NEGATIVE: 1
ENDOCRINE NEGATIVE: 1
NUMBNESS: 1
HEMATOLOGIC/LYMPHATIC NEGATIVE: 1
CARDIOVASCULAR NEGATIVE: 1
GASTROINTESTINAL NEGATIVE: 1
MYALGIAS: 1
ALLERGIC/IMMUNOLOGIC NEGATIVE: 1
WEAKNESS: 1
EYES NEGATIVE: 1
NECK PAIN: 1

## 2024-09-17 ASSESSMENT — PAIN DESCRIPTION - DESCRIPTORS: DESCRIPTORS: ACHING

## 2024-09-17 ASSESSMENT — PAIN SCALES - GENERAL
PAINLEVEL: 8
PAINLEVEL_OUTOF10: 8

## 2024-09-17 ASSESSMENT — PAIN - FUNCTIONAL ASSESSMENT: PAIN_FUNCTIONAL_ASSESSMENT: 0-10

## 2024-09-17 NOTE — PROGRESS NOTES
MEDICATION NAME: PERCOCET  STRENGTH: 10/325MG  LAST FILL DATE: 24  DATE LAST TAKEN: 24  QUANTITY FILLED: 120  QUANTITY REMAININ  COUNT COMPLETED BY: СЕРГЕЙ NORTON RN and DURAN MONTANA MD      UDS LAST COMPLETED: 2024  CONTROLLED SUBSTANCES AGREEMENT LAST SIGNED: 2024  ORT LAST COMPLETED:2023  Modified Oswestry disability form filled out annually.

## 2024-09-17 NOTE — PROGRESS NOTES
Subjective   Patient ID: Geoffrey Dan is a 53 y.o. male who presents for Diabetic Neuropathy and Neck Pain.  Neck Pain   Associated symptoms include numbness and weakness.   Patient here today for follow up and management of his chronic neck pain, back pain, and his diabetic neuropathy.  He rates his pain as a 8/10 today.  He continues to have severe pain and numbness in his neck and arms.  He had issues with using a CPAP that was listed in a class action law suit.  He does associate this with his pain and when his life changed.  He continues to use his medications as prescribed for benefit.  He finds chores around his home very challenging, such as mowing the yard.  He get severe pain and numbness in his legs and into his groin.    The patient continues to use his medications as prescribed without abusing him.  He states that they do provide him benefit throughout the day so that he can complete some simple ADLs around his home as well as take care of himself.  He does understand that moving forward he has to make a decision between using THC and opioids in agreement with his opioid contract as set forth by [facility].  Or he would need to seek a different provider who would be okay with both therapies.  The patient did voiced understanding.    Review of Systems   Constitutional: Negative.    HENT: Negative.     Eyes: Negative.    Respiratory: Negative.     Cardiovascular: Negative.    Gastrointestinal: Negative.    Endocrine: Negative.    Genitourinary: Negative.    Musculoskeletal:  Positive for back pain, myalgias and neck pain.        Left foot pain     Skin: Negative.    Allergic/Immunologic: Negative.    Neurological:  Positive for weakness and numbness.   Hematological: Negative.    Psychiatric/Behavioral: Negative.         Objective   Physical Exam  Vitals and nursing note reviewed.   Constitutional:       Appearance: Normal appearance.   HENT:      Head: Normocephalic and atraumatic.      Right  Ear: Ear canal and external ear normal.      Left Ear: Ear canal and external ear normal.      Nose: Nose normal.      Mouth/Throat:      Mouth: Mucous membranes are moist.      Pharynx: Oropharynx is clear.   Eyes:      Conjunctiva/sclera: Conjunctivae normal.      Pupils: Pupils are equal, round, and reactive to light.   Cardiovascular:      Rate and Rhythm: Normal rate.   Pulmonary:      Effort: Pulmonary effort is normal. No respiratory distress.   Musculoskeletal:      Cervical back: Neck supple. Spasms and tenderness present. Pain with movement present. Decreased range of motion.      Lumbar back: Tenderness present. Normal range of motion.      Left foot: Decreased range of motion.        Legs:       Comments: RIGHT bka - WOUND       Right Lower Extremity: Right leg is amputated below knee.   Feet:      Left foot:      Skin integrity: Erythema present.   Skin:     General: Skin is warm and dry.   Neurological:      Mental Status: He is alert.      Cranial Nerves: Cranial nerves 2-12 are intact.      Sensory: Sensory deficit present.      Motor: Weakness (hand) present.      Coordination: Coordination is intact.      Gait: Gait is intact.      Deep Tendon Reflexes:      Reflex Scores:       Bicep reflexes are 1+ on the right side and 1+ on the left side.       Brachioradialis reflexes are 1+ on the right side and 1+ on the left side.  Psychiatric:         Mood and Affect: Mood normal.         Thought Content: Thought content normal.         Assessment/Plan   Problem List Items Addressed This Visit             ICD-10-CM    Status post amputation ([facility]) Z89.9    Painful diabetic neuropathy (Multi) - Primary E11.40     Other Visit Diagnoses         Codes    Postlaminectomy syndrome, cervical region     M96.1    Other chronic pain     G89.29        I nice discussion with the patient today our plan will be as follows.    Radiology: All available imaging was reviewed today.    Physically: Patient should continue  home exercise program.    Psychologically: No issues at this time.    Medication: I will refill the patient's opioids today for 2 month.  The patient continues to see benefit and improvement in their quality of life and ability to maintain ADLs.  Patient educated about the risks of taking opioids and operating a motor vehicle.  Patient reports no adverse side effects to current medication regimen.  Current regimen does allow patient to maintain ADLs.  Patient reports no new neurologic symptoms, new pain areas, or exacerbation in pain today.  Patient reports they are happy with current treatment care path.    [place] was reviewed and was consistent with the history.    Patient has been educated on the risks, benefits, and alternatives of controlled substances as well as the proper way to store these medications.  The patient and I discussed the nature of this medication and its side effects.  We discussed tolerance, physical dependence, psychological dependence, addiction and opioid-induced hyperalgesia.  We discussed the potential need to wean from this medication.  We discussed the availability of programs that can help with this process if necessary.  We discussed safety issues related to opioids including safe storage.  We discussed the fact that the patient should not drive an automobile or operate heavy machinery while taking this medication.  A prescription for naloxone was offered to the patient.  The patient will be re-evaluated for the need to continue opioid therapy in 60-90 days.  A urine or saliva specimen was obtained for toxicology screening  We will discuss the patient's concomitant use of opioids and THC at next office visit.    Duration: Greater than 1 year.    Intervention:  No intervention at this time.             Tomi Miranda MD 09/17/24 4:06 PM

## 2024-09-19 ENCOUNTER — OFFICE VISIT (OUTPATIENT)
Dept: CARDIOLOGY | Facility: CLINIC | Age: 54
End: 2024-09-19
Payer: MEDICARE

## 2024-09-19 VITALS
SYSTOLIC BLOOD PRESSURE: 120 MMHG | BODY MASS INDEX: 27.99 KG/M2 | WEIGHT: 218 LBS | HEART RATE: 106 BPM | DIASTOLIC BLOOD PRESSURE: 81 MMHG | OXYGEN SATURATION: 98 %

## 2024-09-19 DIAGNOSIS — R07.2 PRECORDIAL PAIN: Primary | ICD-10-CM

## 2024-09-19 DIAGNOSIS — R93.1 ELEVATED CORONARY ARTERY CALCIUM SCORE: ICD-10-CM

## 2024-09-19 PROCEDURE — 1036F TOBACCO NON-USER: CPT | Performed by: INTERNAL MEDICINE

## 2024-09-19 PROCEDURE — 3044F HG A1C LEVEL LT 7.0%: CPT | Performed by: INTERNAL MEDICINE

## 2024-09-19 PROCEDURE — 4010F ACE/ARB THERAPY RXD/TAKEN: CPT | Performed by: INTERNAL MEDICINE

## 2024-09-19 PROCEDURE — 3074F SYST BP LT 130 MM HG: CPT | Performed by: INTERNAL MEDICINE

## 2024-09-19 PROCEDURE — 99204 OFFICE O/P NEW MOD 45 MIN: CPT | Performed by: INTERNAL MEDICINE

## 2024-09-19 PROCEDURE — 93010 ELECTROCARDIOGRAM REPORT: CPT | Performed by: INTERNAL MEDICINE

## 2024-09-19 PROCEDURE — 3049F LDL-C 100-129 MG/DL: CPT | Performed by: INTERNAL MEDICINE

## 2024-09-19 PROCEDURE — 3079F DIAST BP 80-89 MM HG: CPT | Performed by: INTERNAL MEDICINE

## 2024-09-19 PROCEDURE — 99214 OFFICE O/P EST MOD 30 MIN: CPT | Performed by: INTERNAL MEDICINE

## 2024-09-19 PROCEDURE — 93005 ELECTROCARDIOGRAM TRACING: CPT | Performed by: INTERNAL MEDICINE

## 2024-09-19 ASSESSMENT — ENCOUNTER SYMPTOMS
OCCASIONAL FEELINGS OF UNSTEADINESS: 0
DEPRESSION: 0
LOSS OF SENSATION IN FEET: 0

## 2024-09-19 ASSESSMENT — PAIN SCALES - GENERAL: PAINLEVEL: 5

## 2024-09-19 NOTE — ASSESSMENT & PLAN NOTE
I suggested nuclear stress testing to exclude coronary ischemia as a cause of his symptoms and also coronary calcium score to help guide prognosis future cardiovascular risk.  This can also help us guide statin therapy if his coronary calcium score is significantly elevated.

## 2024-09-19 NOTE — PROGRESS NOTES
Subjective      Chief Complaint   Patient presents with    <9> julian referred for cardiac eval        Referred by his primary care physician for and cardiac evaluation.  He has complaints of persistent anterior chest pain and his EKG today shows sinus rhythm with normal axis and intervals and no evidence of ischemia QTc of 412ms    He has no history of myocardial infarction, heart failure, valvular heart disease, syncope or sustained arrhythmias.  He does have hypertension and hyperlipidemia and diabetes and chronic venous insufficiency         Review of Systems   All other systems reviewed and are negative.       Objective   Physical Exam  Constitutional:       Appearance: Normal appearance.   HENT:      Head: Normocephalic and atraumatic.   Eyes:      Pupils: Pupils are equal, round, and reactive to light.   Cardiovascular:      Rate and Rhythm: Normal rate and regular rhythm.      Pulses: Normal pulses.      Heart sounds: Normal heart sounds.   Pulmonary:      Effort: Pulmonary effort is normal.      Breath sounds: Normal breath sounds.   Abdominal:      General: Abdomen is flat. Bowel sounds are normal.      Palpations: Abdomen is soft.   Musculoskeletal:         General: Normal range of motion.      Cervical back: Normal range of motion.      Comments: Right leg prosthesis from previous right leg amputation   Skin:     General: Skin is warm and dry.   Neurological:      General: No focal deficit present.   Psychiatric:         Mood and Affect: Mood normal.         Judgment: Judgment normal.          Lab Review:   Not applicable    Precordial pain  I suggested nuclear stress testing to exclude coronary ischemia as a cause of his symptoms and also coronary calcium score to help guide prognosis future cardiovascular risk.  This can also help us guide statin therapy if his coronary calcium score is significantly elevated.    Elevated coronary artery calcium score  He is no radiation screening chest CT scan  showed evidence of severe coronary artery calcifications we will evaluate with nuclear stress testing.

## 2024-09-19 NOTE — ASSESSMENT & PLAN NOTE
He is no radiation screening chest CT scan showed evidence of severe coronary artery calcifications we will evaluate with nuclear stress testing.

## 2024-09-26 ENCOUNTER — PHARMACY VISIT (OUTPATIENT)
Dept: PHARMACY | Facility: CLINIC | Age: 54
End: 2024-09-26
Payer: MEDICARE

## 2024-09-26 PROCEDURE — RXMED WILLOW AMBULATORY MEDICATION CHARGE

## 2024-10-09 ENCOUNTER — HOSPITAL ENCOUNTER (OUTPATIENT)
Dept: RADIOLOGY | Facility: HOSPITAL | Age: 54
Discharge: HOME | End: 2024-10-09
Payer: MEDICARE

## 2024-10-09 ENCOUNTER — HOSPITAL ENCOUNTER (OUTPATIENT)
Dept: CARDIOLOGY | Facility: HOSPITAL | Age: 54
Discharge: HOME | End: 2024-10-09
Payer: MEDICARE

## 2024-10-09 DIAGNOSIS — R93.1 ELEVATED CORONARY ARTERY CALCIUM SCORE: ICD-10-CM

## 2024-10-09 DIAGNOSIS — R07.2 PRECORDIAL PAIN: ICD-10-CM

## 2024-10-09 PROCEDURE — 2500000004 HC RX 250 GENERAL PHARMACY W/ HCPCS (ALT 636 FOR OP/ED): Mod: SE | Performed by: INTERNAL MEDICINE

## 2024-10-09 PROCEDURE — 78452 HT MUSCLE IMAGE SPECT MULT: CPT

## 2024-10-09 PROCEDURE — 93018 CV STRESS TEST I&R ONLY: CPT | Performed by: INTERNAL MEDICINE

## 2024-10-09 PROCEDURE — A9502 TC99M TETROFOSMIN: HCPCS | Mod: SE | Performed by: INTERNAL MEDICINE

## 2024-10-09 PROCEDURE — 3430000001 HC RX 343 DIAGNOSTIC RADIOPHARMACEUTICALS: Mod: SE | Performed by: INTERNAL MEDICINE

## 2024-10-09 PROCEDURE — 78452 HT MUSCLE IMAGE SPECT MULT: CPT | Performed by: RADIOLOGY

## 2024-10-09 PROCEDURE — 93016 CV STRESS TEST SUPVJ ONLY: CPT | Performed by: INTERNAL MEDICINE

## 2024-10-09 PROCEDURE — 93017 CV STRESS TEST TRACING ONLY: CPT

## 2024-10-09 RX ORDER — REGADENOSON 0.08 MG/ML
0.4 INJECTION, SOLUTION INTRAVENOUS ONCE
Status: COMPLETED | OUTPATIENT
Start: 2024-10-09 | End: 2024-10-09

## 2024-10-17 ENCOUNTER — OFFICE VISIT (OUTPATIENT)
Dept: CARDIOLOGY | Facility: CLINIC | Age: 54
End: 2024-10-17
Payer: MEDICARE

## 2024-10-17 NOTE — ASSESSMENT & PLAN NOTE
Normal myocardial perfusion on his nuclear stress test from October 2024.  He should benefit from high-dose statin therapy and chronic aspirin therapy or combination therapy with Zetia and modest dose statin therapy

## 2024-10-17 NOTE — PROGRESS NOTES
Subjective      Chief Complaint   Patient presents with    Stress test result         Here for follow-up of his recent nuclear stress test in October 2024 that showed normal myocardial perfusion and normal left ventricular ejection fraction of 63%.  This suggests that his persistent chest discomfort is not related to significant coronary ischemia.  He had a low radiation CT scan of his chest in August 2024 that incidentally noted there are coronary calcifications but these coronary artery calcifications flow restricting.      Previous: Referred by his primary care physician for and cardiac evaluation.  He has complaints of persistent anterior chest pain and his EKG today shows sinus rhythm with normal axis and intervals and no evidence of ischemia QTc of 412ms              Review of Systems   All other systems reviewed and are negative.       Objective   Physical Exam  Constitutional:       Appearance: Normal appearance.   HENT:      Head: Normocephalic and atraumatic.   Eyes:      Pupils: Pupils are equal, round, and reactive to light.   Cardiovascular:      Rate and Rhythm: Normal rate and regular rhythm.      Pulses: Normal pulses.      Heart sounds: Normal heart sounds.   Pulmonary:      Effort: Pulmonary effort is normal.      Breath sounds: Normal breath sounds.   Abdominal:      General: Abdomen is flat. Bowel sounds are normal.      Palpations: Abdomen is soft.   Musculoskeletal:         General: Normal range of motion.      Cervical back: Normal range of motion.   Skin:     General: Skin is warm and dry.   Neurological:      General: No focal deficit present.   Psychiatric:         Mood and Affect: Mood normal.         Judgment: Judgment normal.          Lab Review:   Not applicable    No problem-specific Assessment & Plan notes found for this encounter.

## 2024-10-25 ENCOUNTER — PHARMACY VISIT (OUTPATIENT)
Dept: PHARMACY | Facility: CLINIC | Age: 54
End: 2024-10-25
Payer: MEDICARE

## 2024-10-25 ENCOUNTER — TELEPHONE (OUTPATIENT)
Dept: PAIN MEDICINE | Facility: CLINIC | Age: 54
End: 2024-10-25
Payer: MEDICARE

## 2024-10-25 DIAGNOSIS — M47.817 SPONDYLOSIS WITHOUT MYELOPATHY OR RADICULOPATHY, LUMBOSACRAL REGION: Primary | ICD-10-CM

## 2024-10-25 DIAGNOSIS — M47.812 CERVICAL ARTHRITIS: ICD-10-CM

## 2024-10-25 PROCEDURE — RXMED WILLOW AMBULATORY MEDICATION CHARGE

## 2024-10-25 RX ORDER — DICLOFENAC SODIUM 10 MG/G
4 GEL TOPICAL 4 TIMES DAILY
Qty: 400 G | Refills: 0 | Status: SHIPPED | OUTPATIENT
Start: 2024-10-25 | End: 2024-11-24

## 2024-10-28 PROCEDURE — RXMED WILLOW AMBULATORY MEDICATION CHARGE

## 2024-10-29 ENCOUNTER — PHARMACY VISIT (OUTPATIENT)
Dept: PHARMACY | Facility: CLINIC | Age: 54
End: 2024-10-29
Payer: MEDICARE

## 2024-11-04 ASSESSMENT — ENCOUNTER SYMPTOMS
COUGH: 0
ABDOMINAL PAIN: 0
CHILLS: 0
SHORTNESS OF BREATH: 0
NAUSEA: 0
HEADACHES: 0
FEVER: 0
APPETITE CHANGE: 0
VOMITING: 0
DIARRHEA: 0

## 2024-11-04 NOTE — PROGRESS NOTES
[facility]: [facility]  MEDICARE WELLNESS EXAM      Geoffrey Dan is a 54 y.o. male that is presenting today for Follow-up.    Assessment/Plan    Diagnoses and all orders for this visit:  Annual physical exam  Type 2 diabetes mellitus with diabetic neuropathy, with long-term current use of insulin  -     POCT glycosylated hemoglobin (Hb A1C) manually resulted  Primary hypertension  Coronary artery disease due to lipid rich plaque  Long-term current use of benzodiazepine  Hyperglycemia  Anxiety  -     ALPRAZolam (Xanax) 1 mg tablet; Take 1 tablet (1 mg) by mouth every 8 hours if needed for anxiety.  Vitamin D deficiency  Peripheral vascular disease (CMS-HCC)  Nicotine dependence, cigarettes, in remission  KEYON (obstructive sleep apnea)  -     Referral to Pulmonology; Future  SOB (shortness of breath)  -     Referral to Pulmonology; Future  Bronchitis  -     albuterol (ProAir HFA) 90 mcg/actuation inhaler; Inhale 2 puffs every 4 hours if needed for wheezing or shortness of breath.    Shortness of breath, butyryl somewhat helpful, he did have the elevated calcium score and a negative nuclear stress test I doubt this is an anginal equivalent but he does have follow-up with cardiology.  Should pursue pulmonary function testing and in terms of the pictures of black mucus he is bringing up, I am not sure how to evaluate this other than consideration for bronchoscopy.  Referral to pulmonology placed in that regard.    Diabetes mellitus, now under very good control, continue the metformin and insulin regimen.  No significant lows.   reviewed the continuous glucose monitor data.    Peripheral vascular disease, continue aspirin therapy.    Hypertension controlled on the lisinopril.    Chronic pain, follows with pain management, he is on the oxycodone 40 mg/day which has not changed in some time.    Declines CRC screen - accepts m/m risk    Refuses vaccines.    Anxiety and mental health concerns, possible  underlying bipolar disorder.  He has not been agreeable to seeing psychiatry.  I have continued his chronic benzodiazepine without escalating the dose.    This patient is prescribed a controlled substance.  A controlled substance agreement has been completed and scanned into the chart.  The patient understands that they will need to be seen every 90 days and that [place]AR[place] will be queried and evaluated for inconsistencies at 90 days intervals or more frequently as indicated.    Randy Dan is a 54 year old male with long-standing right leg BK amputation. He had his Right leg amputated below the knee back in February 2017 and has been extremely successful as a k3 functional ambulator. His current prognosis remains good and I expect Geoffrey to regain his full functional ability with replacement prosthetic leg management. Geoffrey has good strength and range of motion in his upper and lower extremities. He presents with good strength in his hip flexors and extensors with no indications of contractures present. Results of muscle strength, range of motion, balance and taking into consideration his previous activities, Geoffrey is expected to function at a K3 level. I expect Geoffrey to ambulate over curbs and stairs, walk with a varied saige and be capable of transversing most all environmental barriers. Prior to his amputation Geoffrey was very active with his left leg amputation; he is not working but expected to still be active. He was also very active and intends to remain active in his community. He lives alone in his own mobile home.  Prior to his need for replacement, Geoffrey ambulated without a walking aide and he was responsible for maintaining his home. He has actively participated in social gatherings and often helps his neighbor who lives nearby him. He can manage stairs leading up to his residence.  I expect Geoffrey to utilize his prosthetic for cleaning, grocery shopping, laundry, ascending and descending stairs and ramps, walking on  grass, walking through mud, rain and snow, gardening, physical exercise and walking in the park, manipulating hills and uneven/rough terrain he encounters on his shopping trips and frequent walks outside; all activities he intends to resume.  Geoffrey will remain at functiona K-LEVEL 3 (K3) after replacement prosthesis.  Geoffrey has the ability for ambulation with variable saige.  He has the ability to transverse most environmental barriers, including those he encounters while living at his apartment, and in the community at the [place] he visits regularly.  He has exercise activity that demands prosthetic utilization beyond simple locomotion; this includes common walks and exercise, shopping and ambulation at high levels through diverse elements such as the steps leading into his and his children's homes, working on his yard or maneuvering around obstacles or crowds when he goes shopping.  Geoffrey requires his prosthesis to provide, restore or maintain community ambulation at variable saige.  For example, Geoffrey needs to be able to vary his saige with a crowd of people at the grocery store or maneuvering to his seat at the [employer].  His prosthesis is required to provide or restore safe, comfortable and nominal residual limb pressure.  His prosthesis is required to reduce pressure, strain or stress on the sound side.  His prosthesis is required to promote good balance and stability and to reduce the need for a walking aid. His prosthesis will balance pressure equally through both limbs discouraging compensation.  His prosthesis is required to minimize assistance from others and maximize his independence. Geoffrey must be capable of achieving his daily maintenance tasks independently.  Geoffrey is very motivated to ambulate and regain the activities he enjoyed previous to his ill-fitting leg. He aspires to be able to complete his own ADL's and care for/run his household without assistance. He would like to be able to accompany his  friends out to lunch.  Geoffrey will be made a Right BK prosthetic from [employer]. The socket will be double walled, with a flexible inner socket and laminated outer socket with cutouts to accommodate bambi prominences and sensitivities in his residual limb. This design will allow socket modifications as needed. The prosthetic will be fully alignable and made of light weight materials; this will set tibial alignment, gait symmetry and distribute evenly inner socket pressures; while also allowing increased levels of ambulation with less fatigue. Geoffrey will need soft, durable single and multi-ply socks to accommodate his expected volume changes, imperative in sustaining equal limb compression. The prosthetic will have a vacuum suction suspension system with silicone liners and suspension sleeves to protect his skin for high activity walking. This prosthetic suspension will protect his residual limb by reducing movement within the socket and avoiding socket rotation which can lead to skin damage. Geoffrey will be fit with a carbon foot system to help him best achieve his goals of ambulation. This foot system will allow vertical shock absorption which I feel will reduce limb impact and inner socket forces and will accommodate Geoffrey's long walks, shopping trips, and daily activities.      ADVANCED CARE PLANNING  Advanced Care Planning was discussed with patient:  The patient has an active advanced care plan on file. The patient has an active surrogate decision-maker on file.  Encouraged the patient to confirm that Living Will and Healthcare Power of  (HCPoA) are accurate and up to date.  Encouraged the patient to confirm that our office be provided a copy of any documentation in the event that anything changes.    ACTIVITIES OF DAILY LIVING  Basic ADLs:  Bathing: Independent, Dressing: Independent, Toileting: Independent, Transferring: Independent, Continence: Independent, Feeding: Independent.    Instrumental ADLs:  Ability to  use phone: Independent, Shopping: Independent, Cooking: Independent, House-keeping: Independent, Laundry: Independent, Transportation: Independent, Medication Management: Independent, Finance Management: Independent.    Subjective   HPI  This patient presents today for annual physical, Medicare wellness exam.  Discussed screening/prevention, healthy lifestyle and code status.   Reviewed the patient's wishes regarding decision making.    Consultant visits and notes reviewed: [facility] - cardiac eval, nuclear stress test negative    Stable from a functional standpoint in regard to ADLs and IADLs.  No recent falls are reported.    Continues to have shortness of breath, albuterol helps somewhat.  Says that he coughs up black tarry mucus.  Feels it is connected to his recalled CPAP machine.    The patient continues to wear his right below-knee prosthesis every day, all day.  He does extremely well with it.  He is a current K3 user.  He does, however, need to have his prosthetic liners, socks and sleeves were placed.  They are very well-worn and stretched out.  The liners have holes in them.  The wires were used to hold the socket in place.  Without new liners he takes the chance of a skin breakdown or using suction, causing a fall.  The socks sleeves are also needed to keep his socket on snugly.  I will be sending him to see [facility] prost[facility] and orthotics in [place], [place].    Review of Systems   Constitutional:  Negative for appetite change, chills and fever.   Respiratory:  Negative for cough and shortness of breath.    Cardiovascular:  Negative for chest pain.   Gastrointestinal:  Negative for abdominal pain, diarrhea, nausea and vomiting.   Neurological:  Negative for headaches.   All other systems reviewed and are negative.    Objective   Vitals:    11/06/24 1600   BP: 140/90   Pulse: (!) 112   Temp: 36.6 °C (97.8 °F)   SpO2: 98%      Body mass index is 27.99 kg/m².  Physical Exam  Vitals reviewed.   Constitutional:      "  General: He is not in acute distress.     Appearance: He is not toxic-appearing.   HENT:      Head: Normocephalic and atraumatic.      Mouth/Throat:      Mouth: Mucous membranes are moist.   Eyes:      Pupils: Pupils are equal, round, and reactive to light.   Cardiovascular:      Rate and Rhythm: Normal rate and regular rhythm.      Heart sounds: No murmur heard.  Pulmonary:      Breath sounds: Normal breath sounds. No wheezing, rhonchi or rales.   Abdominal:      General: There is no distension.      Palpations: Abdomen is soft.   Musculoskeletal:      Right lower leg: No edema.      Left lower leg: No edema.   Neurological:      General: No focal deficit present.      Mental Status: He is alert and oriented to person, place, and time.       Diagnostic Results   Lab Results   Component Value Date    GLUCOSE 124 (H) 08/08/2024    CALCIUM 9.7 08/08/2024     08/08/2024    K 4.1 08/08/2024    CO2 28 08/08/2024    CL 97 (L) 08/08/2024    BUN 12 08/08/2024    CREATININE 0.83 08/08/2024     Lab Results   Component Value Date    ALT 25 08/08/2024    AST 22 08/08/2024    ALKPHOS 67 08/08/2024    BILITOT 0.7 08/08/2024     Lab Results   Component Value Date    WBC 7.9 08/08/2024    HGB 16.1 08/08/2024    HCT 47.5 08/08/2024    MCV 90 08/08/2024     08/08/2024     Lab Results   Component Value Date    CHOL 196 08/08/2024    CHOL 196 12/19/2022    CHOL 211 (H) 09/24/2020     Lab Results   Component Value Date    HDL 48.9 08/08/2024    HDL 36 (L) 12/19/2022    HDL 36 (L) 09/24/2020     Lab Results   Component Value Date    LDLCALC 107 (H) 08/08/2024    LDLCALC 81 12/19/2022    LDLCALC 144 (H) 09/24/2020     Lab Results   Component Value Date    TRIG 199 (H) 08/08/2024    TRIG 397 (H) 12/19/2022    TRIG 156 (H) 09/24/2020     No components found for: \"CHOLHDL\"  Lab Results   Component Value Date    HGBA1C 5.4 11/06/2024     Other labs not included in the list above reviewed either before or during this " encounter.    History   Past Medical History:   Diagnosis Date    Personal history of other endocrine, nutritional and metabolic disease     History of diabetes mellitus     Past Surgical History:   Procedure Laterality Date    CT AORTA AND BILATERAL ILIOFEMORAL RUNOFF ANGIOGRAM W AND/OR WO IV CONTRAST  12/6/2017    CT AORTA AND BILATERAL ILIOFEMORAL RUNOFF ANGIOGRAM W AND/OR WO IV CONTRAST 12/6/2017 [facility]    OTHER SURGICAL HISTORY  05/29/2013    Leg Repair    OTHER SURGICAL HISTORY  01/30/2018    Amputation Of Leg Below Knee     Family History   Adopted: Yes     Social History     Socioeconomic History    Marital status:      Spouse name: Not on file    Number of children: Not on file    Years of education: Not on file    Highest education level: Not on file   Occupational History    Not on file   Tobacco Use    Smoking status: Former     Types: Cigarettes    Smokeless tobacco: Never   Vaping Use    Vaping status: Never Used   Substance and Sexual Activity    Alcohol use: Never    Drug use: Yes     Comment: medical marijuana card    Sexual activity: Defer   Other Topics Concern    Not on file   Social History Narrative    Not on file     Social Drivers of Health     Financial Resource Strain: Not on File (4/21/2022)    Received from [employer]    Financial Resource Strain     Financial Resource Strain: 0   Food Insecurity: Not on File (9/26/2024)    Received from [employer]    Food Insecurity     Food: 0   Transportation Needs: Not on File (4/21/2022)    Received from [employer]    Transportation Needs     Transportation: 0   Physical Activity: Not on File (9/23/2021)    [facility]    Physical Activity     Physical Activity: 0   Stress: Not on File (4/21/2022)    Received from [employer]    Stress     Stress: 0   Social Connections: Not on File (4/21/2022)    Received from [employer]    Social Connections     Connectedness: 0   Intimate Partner Violence: Not on file   Housing Stability: Not on File  "(2022)    Received from [employer] [facility]     Allergies   Allergen Reactions    Atorvastatin Hives, Shortness of breath and Other     Facial edema   Resp distress    Fenofibrate Nanocrystallized Shortness of breath and Other     Facial edema   Resp distress    Insulin Glargine Other and Shortness of breath    Moxifloxacin Shortness of breath and Rash     Racing heart  splotches    Tramadol Shortness of breath    Other Other     \"All Antihypertensives\"  Throat swelling  Passed out      Pravastatin Unknown    Apixaban Rash     Racing heart     Current Outpatient Medications on File Prior to Visit   Medication Sig Dispense Refill    amitriptyline (Elavil) 50 mg tablet TAKE 1 TABLET(50 MG) BY MOUTH DAILY AT BEDTIME 90 tablet 3    aspirin 81 mg chewable tablet Chew 1 tablet (81 mg) once daily.      blood sugar diagnostic (Accu-Chek Guide test strips) strip USE AS DIRECTED FOR TESTING BLOOD GLUCOSE TWICE DAILY      diclofenac sodium (Voltaren) 1 % gel Apply 4.5 inches (4 g) topically 4 times a day. 400 g 0    fluticasone (Flonase) 50 mcg/actuation nasal spray SHAKE LIQUID AND USE 1 SPRAY IN EACH NOSTRIL EVERY DAY 16 g 11    gabapentin (Neurontin) 300 mg capsule Take 2 capsules (600 mg) by mouth 3 times a day. 540 capsule 2    insulin aspart (NovoLOG Flexpen U-100 Insulin) 100 unit/mL (3 mL) pen Inject 20 Units under the skin 3 times a day before meals (Patient taking differently: Inject 20 Units under the skin 3 times a day before meals. SLIDING SCALE) 15 mL 11    insulin glargine (Lantus Solostar U-100 Insulin) 100 unit/mL (3 mL) pen Inject 60 Units under the skin once daily in the morning. 45 mL 3    insulin glargine (Lantus Solostar U-100 Insulin) 100 unit/mL (3 mL) pen Inject 60 Units under the skin once daily in the morning. 45 mL 3    lidocaine (Lidoderm) 5 % patch Place 1 patch on the skin once daily. remove after 12 hours      linaCLOtide (Linzess) 145 mcg capsule Take 1 capsule (145 " "mcg) by mouth once daily in the morning. Take before meals.      lisinopril 10 mg tablet Take 1 tablet (10 mg) by mouth once daily. 30 tablet 11    medical cannabis Medical Marijuana      metFORMIN (Glucophage) 1,000 mg tablet TAKE 1 TABLET BY MOUTH TWICE DAILY WITH MEALS 180 tablet 3    naloxone (Narcan) 4 mg/0.1 mL nasal spray Administer 1 spray (4 mg) into affected nostril(s) if needed for opioid reversal. May repeat every 2-3 minutes if needed, alternating nostrils, until medical assistance becomes available. 2 each 0    NovoLOG Flexpen U-100 Insulin 100 unit/mL (3 mL) pen INJECT 20 UNITS UNDER THE SKIN THREE TIMES DAILY UP TO 60 UNITS PER DAY 54 mL 3    pen needle 1/2\" (BD Ultra-Fine Orig Pen Needle) 29G X 12mm needle every 6 hours. 1 subcutaneously four times a day      pen needle, diabetic (BD Ultra-Fine Delilah Pen Needle) 32 gauge x 5/32\" needle USE DAILY AS DIRECTED 100 each 11    tiZANidine (Zanaflex) 4 mg tablet TAKE 1 TABLET BY MOUTH EVERY 4 HOURS AS NEEDED 540 tablet 3    traZODone (Desyrel) 100 mg tablet Take 1 tablet (100 mg) by mouth once daily at bedtime. 90 tablet 1    [DISCONTINUED] albuterol (ProAir HFA) 90 mcg/actuation inhaler Inhale 2 puffs every 4 hours if needed for wheezing or shortness of breath. 8.5 g 0    [DISCONTINUED] ALPRAZolam (Xanax) 1 mg tablet Take 1 tablet (1 mg) by mouth every 8 hours if needed for anxiety. 90 tablet 2    oxyCODONE-acetaminophen (Percocet)  mg tablet Take 1 tablet by mouth every 6 hours if needed for severe pain (7 - 10). Do not fill before October 25, 2024. (Patient not taking: Reported on 9/19/2024) 120 tablet 0     No current facility-administered medications on file prior to visit.     Immunization History   Administered Date(s) Administered    Flu vaccine (IIV4), preservative free *Check age/dose* 10/18/2013    Flu vaccine, trivalent, preservative free, age 6 months and greater (Fluarix/Fluzone/Flulaval) 10/03/2016    Influenza, Unspecified 12/02/2009, " 10/26/2010, 11/14/2012    Influenza, seasonal, injectable 10/16/2017    Pneumococcal polysaccharide vaccine, 23-valent, age 2 years and older (PNEUMOVAX 23) 12/02/2009    Tdap vaccine, age 7 year and older (BOOSTRIX, ADACEL) 12/20/2019     Patient's medical history was reviewed and updated either before or during this encounter.     Chas Cordova MD

## 2024-11-06 ENCOUNTER — TELEPHONE (OUTPATIENT)
Dept: PRIMARY CARE | Facility: CLINIC | Age: 54
End: 2024-11-06

## 2024-11-06 ENCOUNTER — OFFICE VISIT (OUTPATIENT)
Dept: PRIMARY CARE | Facility: CLINIC | Age: 54
End: 2024-11-06
Payer: MEDICARE

## 2024-11-06 VITALS
WEIGHT: 218 LBS | OXYGEN SATURATION: 98 % | HEART RATE: 112 BPM | TEMPERATURE: 97.8 F | SYSTOLIC BLOOD PRESSURE: 140 MMHG | HEIGHT: 74 IN | DIASTOLIC BLOOD PRESSURE: 90 MMHG | BODY MASS INDEX: 27.98 KG/M2

## 2024-11-06 DIAGNOSIS — G47.33 OSA (OBSTRUCTIVE SLEEP APNEA): ICD-10-CM

## 2024-11-06 DIAGNOSIS — Z79.899 LONG-TERM CURRENT USE OF BENZODIAZEPINE: ICD-10-CM

## 2024-11-06 DIAGNOSIS — J40 BRONCHITIS: ICD-10-CM

## 2024-11-06 DIAGNOSIS — Z79.4 TYPE 2 DIABETES MELLITUS WITH DIABETIC NEUROPATHY, WITH LONG-TERM CURRENT USE OF INSULIN: ICD-10-CM

## 2024-11-06 DIAGNOSIS — E55.9 VITAMIN D DEFICIENCY: ICD-10-CM

## 2024-11-06 DIAGNOSIS — R06.02 SOB (SHORTNESS OF BREATH): ICD-10-CM

## 2024-11-06 DIAGNOSIS — I10 PRIMARY HYPERTENSION: ICD-10-CM

## 2024-11-06 DIAGNOSIS — E11.40 TYPE 2 DIABETES MELLITUS WITH DIABETIC NEUROPATHY, WITH LONG-TERM CURRENT USE OF INSULIN: ICD-10-CM

## 2024-11-06 DIAGNOSIS — I25.83 CORONARY ARTERY DISEASE DUE TO LIPID RICH PLAQUE: ICD-10-CM

## 2024-11-06 DIAGNOSIS — F17.211 NICOTINE DEPENDENCE, CIGARETTES, IN REMISSION: ICD-10-CM

## 2024-11-06 DIAGNOSIS — Z89.9 STATUS POST AMPUTATION (HHS-HCC): Primary | ICD-10-CM

## 2024-11-06 DIAGNOSIS — Z00.00 ANNUAL PHYSICAL EXAM: Primary | ICD-10-CM

## 2024-11-06 DIAGNOSIS — F41.9 ANXIETY: ICD-10-CM

## 2024-11-06 DIAGNOSIS — I25.10 CORONARY ARTERY DISEASE DUE TO LIPID RICH PLAQUE: ICD-10-CM

## 2024-11-06 DIAGNOSIS — R73.9 HYPERGLYCEMIA: ICD-10-CM

## 2024-11-06 DIAGNOSIS — I73.9 PERIPHERAL VASCULAR DISEASE (CMS-HCC): ICD-10-CM

## 2024-11-06 LAB — POC HEMOGLOBIN A1C: 5.4 % (ref 4.2–6.5)

## 2024-11-06 PROCEDURE — 3008F BODY MASS INDEX DOCD: CPT | Performed by: INTERNAL MEDICINE

## 2024-11-06 PROCEDURE — 99214 OFFICE O/P EST MOD 30 MIN: CPT | Performed by: INTERNAL MEDICINE

## 2024-11-06 PROCEDURE — 1036F TOBACCO NON-USER: CPT | Performed by: INTERNAL MEDICINE

## 2024-11-06 PROCEDURE — 3044F HG A1C LEVEL LT 7.0%: CPT | Performed by: INTERNAL MEDICINE

## 2024-11-06 PROCEDURE — 4010F ACE/ARB THERAPY RXD/TAKEN: CPT | Performed by: INTERNAL MEDICINE

## 2024-11-06 PROCEDURE — 3077F SYST BP >= 140 MM HG: CPT | Performed by: INTERNAL MEDICINE

## 2024-11-06 PROCEDURE — G0439 PPPS, SUBSEQ VISIT: HCPCS | Performed by: INTERNAL MEDICINE

## 2024-11-06 PROCEDURE — 3049F LDL-C 100-129 MG/DL: CPT | Performed by: INTERNAL MEDICINE

## 2024-11-06 PROCEDURE — 83036 HEMOGLOBIN GLYCOSYLATED A1C: CPT | Mod: MUE | Performed by: INTERNAL MEDICINE

## 2024-11-06 PROCEDURE — 3080F DIAST BP >= 90 MM HG: CPT | Performed by: INTERNAL MEDICINE

## 2024-11-06 PROCEDURE — 99215 OFFICE O/P EST HI 40 MIN: CPT | Performed by: INTERNAL MEDICINE

## 2024-11-06 RX ORDER — ALBUTEROL SULFATE 90 UG/1
2 INHALANT RESPIRATORY (INHALATION) EVERY 4 HOURS PRN
Qty: 8.5 G | Refills: 11 | Status: SHIPPED | OUTPATIENT
Start: 2024-11-06 | End: 2025-11-06

## 2024-11-06 RX ORDER — ALPRAZOLAM 1 MG/1
1 TABLET ORAL EVERY 8 HOURS PRN
Qty: 90 TABLET | Refills: 2 | Status: SHIPPED | OUTPATIENT
Start: 2024-11-06

## 2024-11-06 ASSESSMENT — PAIN SCALES - GENERAL: PAINLEVEL_OUTOF10: 8

## 2024-11-07 ENCOUNTER — APPOINTMENT (OUTPATIENT)
Dept: CARDIOLOGY | Facility: CLINIC | Age: 54
End: 2024-11-07
Payer: MEDICARE

## 2024-11-12 ENCOUNTER — APPOINTMENT (OUTPATIENT)
Age: 54
End: 2024-11-12
Payer: MEDICARE

## 2024-11-12 VITALS — DIASTOLIC BLOOD PRESSURE: 84 MMHG | HEART RATE: 92 BPM | SYSTOLIC BLOOD PRESSURE: 160 MMHG | OXYGEN SATURATION: 99 %

## 2024-11-12 DIAGNOSIS — J43.9 PULMONARY EMPHYSEMA, UNSPECIFIED EMPHYSEMA TYPE (MULTI): Primary | ICD-10-CM

## 2024-11-12 DIAGNOSIS — R00.1 JUNCTIONAL BRADYCARDIA: ICD-10-CM

## 2024-11-12 PROCEDURE — 3077F SYST BP >= 140 MM HG: CPT | Performed by: INTERNAL MEDICINE

## 2024-11-12 PROCEDURE — 4010F ACE/ARB THERAPY RXD/TAKEN: CPT | Performed by: INTERNAL MEDICINE

## 2024-11-12 PROCEDURE — 3044F HG A1C LEVEL LT 7.0%: CPT | Performed by: INTERNAL MEDICINE

## 2024-11-12 PROCEDURE — 99214 OFFICE O/P EST MOD 30 MIN: CPT | Performed by: INTERNAL MEDICINE

## 2024-11-12 PROCEDURE — 93000 ELECTROCARDIOGRAM COMPLETE: CPT | Performed by: INTERNAL MEDICINE

## 2024-11-12 PROCEDURE — 3079F DIAST BP 80-89 MM HG: CPT | Performed by: INTERNAL MEDICINE

## 2024-11-12 PROCEDURE — RXMED WILLOW AMBULATORY MEDICATION CHARGE

## 2024-11-12 PROCEDURE — 3049F LDL-C 100-129 MG/DL: CPT | Performed by: INTERNAL MEDICINE

## 2024-11-12 RX ORDER — NITROGLYCERIN 40 MG/1
1 PATCH TRANSDERMAL DAILY
Qty: 30 PATCH | Refills: 11 | Status: SHIPPED | OUTPATIENT
Start: 2024-11-12 | End: 2025-11-12

## 2024-11-12 ASSESSMENT — PAIN SCALES - GENERAL: PAINLEVEL_OUTOF10: 0-NO PAIN

## 2024-11-12 NOTE — ASSESSMENT & PLAN NOTE
He had CT scanning of his chest performed that showed evidence of bilateral emphysematous lung disease I am referring him to Dr. Hardy and Dr. Sher for pulmonary consultation

## 2024-11-12 NOTE — ASSESSMENT & PLAN NOTE
He did have increased coronary artery calcification but nonischemic nuclear stress test with normal left ventricular systolic function.  Topical nitrate therapy to see if this helps with his shortness of breath

## 2024-11-12 NOTE — PROGRESS NOTES
"      CARDIOLOGY    Jonathon Martin MD    ENCOUNTER DATE:  04/18/2024    Gilbert Phillips / 64 y.o. / male        CHIEF COMPLAINT / REASON FOR OFFICE VISIT     Abnormal ECG (/)      HISTORY OF PRESENT ILLNESS       HPI  Gilbert Phillips is a 64 y.o. male who presents today for evaluation.  Patient undergone a Medicare wellness ECG.  He was found to be abnormal and he subsequently was referred for further evaluation.  The ECG was available to me.  It was done on 3/11/2024 at 123.  Patient clinically is asymptomatic.  He exercises frequently.  He walks about 4 miles a day takes him a little over an hour to do that he try to keep to a 15-minute mile.  He has no symptoms with this.  He has been on cholesterol medicines for years.      The following portions of the patient's history were reviewed and updated as appropriate: allergies, current medications, past family history, past medical history, past social history, past surgical history and problem list.      VITAL SIGNS     Visit Vitals  /74 (BP Location: Left arm)   Pulse 60   Ht 170.2 cm (67\")   Wt 78.9 kg (174 lb)   BMI 27.25 kg/m²         Wt Readings from Last 3 Encounters:   04/18/24 78.9 kg (174 lb)   03/11/24 77.6 kg (171 lb)   03/06/23 76.7 kg (169 lb)     Body mass index is 27.25 kg/m².      REVIEW OF SYSTEMS   ROS        PHYSICAL EXAMINATION     Vitals reviewed.   Constitutional:       Appearance: Healthy appearance.   Pulmonary:      Effort: Pulmonary effort is normal.   Cardiovascular:      Normal rate. Regular rhythm. Normal S1. Normal S2.       Murmurs: There is no murmur.      No gallop.  No click. No rub.   Pulses:     Intact distal pulses.   Edema:     Peripheral edema absent.   Neurological:      Mental Status: Alert.           REVIEWED DATA     Procedures    Cardiac Procedures:      Lipid Panel          1/5/2024    14:27   Lipid Panel   Total Cholesterol 144    Triglycerides 58    HDL Cholesterol 59    VLDL Cholesterol 12    LDL Cholesterol  73  " Subjective      Chief Complaint   Patient presents with    Follow-up        Here for review the results of his stress test, and no new anginal complaints.  He has history of emphysema, thinks that it was related to previous machine for obstructive sleep apnea he coughs up a lot of mucus and admits to a previous smoking history for about 20 years but only a half a pack per day per his description, quit 17 years ago.    Previous: Recent nuclear stress test in October 2024 that showed normal myocardial perfusion and normal left ventricular ejection fraction of 63%.  This suggests that his persistent chest discomfort is not related to significant coronary ischemia.  He had a low radiation CT scan of his chest in August 2024 that incidentally noted there are coronary calcifications but these coronary artery calcifications flow restricting.         Previous:    Review of Systems   All other systems reviewed and are negative.       Objective   Physical Exam  Constitutional:       Appearance: Normal appearance.   HENT:      Head: Normocephalic and atraumatic.   Eyes:      Pupils: Pupils are equal, round, and reactive to light.   Cardiovascular:      Rate and Rhythm: Normal rate and regular rhythm.      Pulses: Normal pulses.      Heart sounds: Normal heart sounds.   Pulmonary:      Effort: Pulmonary effort is normal.      Breath sounds: Normal breath sounds.   Abdominal:      General: Abdomen is flat. Bowel sounds are normal.      Palpations: Abdomen is soft.   Musculoskeletal:         General: Normal range of motion.      Cervical back: Normal range of motion.      Comments: Right leg amputated with brace intact   Skin:     General: Skin is warm and dry.   Neurological:      General: No focal deficit present.   Psychiatric:         Mood and Affect: Mood normal.         Judgment: Judgment normal.          Lab Review:   Not applicable    Elevated coronary artery calcium score  He did have increased coronary artery          ASSESSMENT & PLAN      Diagnosis Plan   1. Essential hypertension              SUMMARY/DISCUSSION  History of hypertension.  Patient's blood pressure is good.  Abnormal ECG.  I did review the ECG.  He has a Q waves in his inferior leads when she is allowed with low voltage in the inferior leads and some baseline artifact.  With him exercising so much and not having any issues I am not very concerned about this EKG.  After an extensive discussion 1 concern and I did have is whether we should do a calcium scoring.  If he has a high calcium score then I would consider doing a stress test and I also think it would change the dosing of his cholesterol medications.  He stopped taking an aspirin several years ago but again if he has a high calcium score it would benefit him to take an aspirin.  He currently is going on a trip I told him that when he gets back from his trip I would get the calcium score and we can make further recommendations at that point.        MEDICATIONS         Discharge Medications            Accurate as of April 18, 2024 12:54 PM. If you have any questions, ask your nurse or doctor.                Continue These Medications        Instructions Start Date   amLODIPine 5 MG tablet  Commonly known as: NORVASC   5 mg, Oral, Daily      atorvastatin 20 MG tablet  Commonly known as: LIPITOR   20 mg, Oral, Nightly      valsartan 320 MG tablet  Commonly known as: DIOVAN   320 mg, Oral, Daily                   **Dragon Disclaimer:   Much of this encounter note is an electronic transcription/translation of spoken language to printed text. The electronic translation of spoken language may permit erroneous, or at times, nonsensical words or phrases to be inadvertently transcribed. Although I have reviewed the note for such errors, some may still exist.    calcification but nonischemic nuclear stress test with normal left ventricular systolic function.  Topical nitrate therapy to see if this helps with his shortness of breath    Pulmonary emphysema (Multi)  He had CT scanning of his chest performed that showed evidence of bilateral emphysematous lung disease I am referring him to Dr. Hardy and Dr. Sher for pulmonary consultation    Junctional bradycardia  His EKG in the office was a suboptimal study but appears to reveal junctional bradycardia and he should have a repeat EKG prior to his follow-up visit

## 2024-11-13 ENCOUNTER — PHARMACY VISIT (OUTPATIENT)
Dept: PHARMACY | Facility: CLINIC | Age: 54
End: 2024-11-13
Payer: MEDICARE

## 2024-11-13 PROCEDURE — RXMED WILLOW AMBULATORY MEDICATION CHARGE

## 2024-11-15 PROBLEM — R00.1 JUNCTIONAL BRADYCARDIA: Status: ACTIVE | Noted: 2024-11-15

## 2024-11-15 NOTE — ASSESSMENT & PLAN NOTE
His EKG in the office was a suboptimal study but appears to reveal junctional bradycardia and he should have a repeat EKG prior to his follow-up visit

## 2024-11-16 ENCOUNTER — PHARMACY VISIT (OUTPATIENT)
Dept: PHARMACY | Facility: CLINIC | Age: 54
End: 2024-11-16
Payer: MEDICARE

## 2024-11-18 ENCOUNTER — TELEPHONE (OUTPATIENT)
Dept: CARDIOLOGY | Facility: CLINIC | Age: 54
End: 2024-11-18
Payer: MEDICARE

## 2024-11-18 NOTE — TELEPHONE ENCOUNTER
Called patient to set up time to come in for repeat EKG as the quality of initial EKG is not sufficient per Dr. Pretty. Left Message for call back

## 2024-11-19 ENCOUNTER — APPOINTMENT (OUTPATIENT)
Dept: PAIN MEDICINE | Facility: CLINIC | Age: 54
End: 2024-11-19
Payer: MEDICARE

## 2024-11-19 DIAGNOSIS — M96.1 CERVICAL POSTLAMINECTOMY SYNDROME: ICD-10-CM

## 2024-11-19 DIAGNOSIS — M96.1 POSTLAMINECTOMY SYNDROME, CERVICAL REGION: ICD-10-CM

## 2024-11-19 DIAGNOSIS — E11.40 PAINFUL DIABETIC NEUROPATHY (MULTI): ICD-10-CM

## 2024-11-19 PROCEDURE — RXMED WILLOW AMBULATORY MEDICATION CHARGE

## 2024-11-19 RX ORDER — OXYCODONE AND ACETAMINOPHEN 10; 325 MG/1; MG/1
1 TABLET ORAL EVERY 6 HOURS PRN
Qty: 12 TABLET | Refills: 0 | Status: SHIPPED | OUTPATIENT
Start: 2024-11-23 | End: 2024-11-26

## 2024-11-20 ENCOUNTER — PHARMACY VISIT (OUTPATIENT)
Dept: PHARMACY | Facility: CLINIC | Age: 54
End: 2024-11-20
Payer: MEDICARE

## 2024-11-20 NOTE — TELEPHONE ENCOUNTER
Called patient 2nd time, left message on voicemail asking to call and set up time to do repeat EKG.

## 2024-11-22 PROCEDURE — RXMED WILLOW AMBULATORY MEDICATION CHARGE

## 2024-11-23 ENCOUNTER — PHARMACY VISIT (OUTPATIENT)
Dept: PHARMACY | Facility: CLINIC | Age: 54
End: 2024-11-23
Payer: MEDICARE

## 2024-11-23 PROCEDURE — RXMED WILLOW AMBULATORY MEDICATION CHARGE

## 2024-11-25 ENCOUNTER — PHARMACY VISIT (OUTPATIENT)
Dept: PHARMACY | Facility: CLINIC | Age: 54
End: 2024-11-25
Payer: MEDICARE

## 2024-11-25 ENCOUNTER — OFFICE VISIT (OUTPATIENT)
Dept: PAIN MEDICINE | Facility: CLINIC | Age: 54
End: 2024-11-25
Payer: MEDICARE

## 2024-11-25 VITALS
WEIGHT: 218 LBS | HEART RATE: 99 BPM | SYSTOLIC BLOOD PRESSURE: 133 MMHG | OXYGEN SATURATION: 99 % | BODY MASS INDEX: 27.98 KG/M2 | HEIGHT: 74 IN | DIASTOLIC BLOOD PRESSURE: 94 MMHG

## 2024-11-25 DIAGNOSIS — E11.40 PAINFUL DIABETIC NEUROPATHY (MULTI): ICD-10-CM

## 2024-11-25 DIAGNOSIS — G54.6 PHANTOM LIMB PAIN (MULTI): ICD-10-CM

## 2024-11-25 DIAGNOSIS — M96.1 CERVICAL POSTLAMINECTOMY SYNDROME: ICD-10-CM

## 2024-11-25 DIAGNOSIS — G89.29 OTHER CHRONIC PAIN: Primary | ICD-10-CM

## 2024-11-25 DIAGNOSIS — M96.1 POSTLAMINECTOMY SYNDROME, CERVICAL REGION: ICD-10-CM

## 2024-11-25 PROCEDURE — 4010F ACE/ARB THERAPY RXD/TAKEN: CPT | Performed by: ANESTHESIOLOGY

## 2024-11-25 PROCEDURE — 99214 OFFICE O/P EST MOD 30 MIN: CPT | Performed by: ANESTHESIOLOGY

## 2024-11-25 PROCEDURE — 3044F HG A1C LEVEL LT 7.0%: CPT | Performed by: ANESTHESIOLOGY

## 2024-11-25 PROCEDURE — 3049F LDL-C 100-129 MG/DL: CPT | Performed by: ANESTHESIOLOGY

## 2024-11-25 PROCEDURE — 3080F DIAST BP >= 90 MM HG: CPT | Performed by: ANESTHESIOLOGY

## 2024-11-25 PROCEDURE — 99417 PROLNG OP E/M EACH 15 MIN: CPT | Performed by: ANESTHESIOLOGY

## 2024-11-25 PROCEDURE — 3008F BODY MASS INDEX DOCD: CPT | Performed by: ANESTHESIOLOGY

## 2024-11-25 PROCEDURE — RXMED WILLOW AMBULATORY MEDICATION CHARGE

## 2024-11-25 PROCEDURE — 3075F SYST BP GE 130 - 139MM HG: CPT | Performed by: ANESTHESIOLOGY

## 2024-11-25 RX ORDER — OXYCODONE AND ACETAMINOPHEN 10; 325 MG/1; MG/1
1 TABLET ORAL EVERY 6 HOURS PRN
Qty: 120 TABLET | Refills: 0 | Status: SHIPPED | OUTPATIENT
Start: 2024-12-24 | End: 2025-01-23

## 2024-11-25 RX ORDER — OXYCODONE AND ACETAMINOPHEN 10; 325 MG/1; MG/1
1 TABLET ORAL EVERY 6 HOURS PRN
Qty: 120 TABLET | Refills: 0 | Status: SHIPPED | OUTPATIENT
Start: 2024-11-25 | End: 2024-12-25

## 2024-11-25 ASSESSMENT — ENCOUNTER SYMPTOMS
BACK PAIN: 1
CARDIOVASCULAR NEGATIVE: 1
PAIN: 1
GASTROINTESTINAL NEGATIVE: 1
ENDOCRINE NEGATIVE: 1
CONSTITUTIONAL NEGATIVE: 1
WEAKNESS: 1
NUMBNESS: 1
RESPIRATORY NEGATIVE: 1
HEMATOLOGIC/LYMPHATIC NEGATIVE: 1
MYALGIAS: 1
NECK PAIN: 1
EYES NEGATIVE: 1
PSYCHIATRIC NEGATIVE: 1
ALLERGIC/IMMUNOLOGIC NEGATIVE: 1

## 2024-11-25 ASSESSMENT — PAIN - FUNCTIONAL ASSESSMENT: PAIN_FUNCTIONAL_ASSESSMENT: 0-10

## 2024-11-25 ASSESSMENT — PAIN SCALES - GENERAL
PAINLEVEL_OUTOF10: 9
PAINLEVEL_OUTOF10: 9

## 2024-11-25 ASSESSMENT — PAIN DESCRIPTION - DESCRIPTORS: DESCRIPTORS: ACHING;SHARP

## 2024-11-25 NOTE — PROGRESS NOTES
Subjective   Patient ID: Geoffrey Dan is a 54 y.o. male who presents for Pain and Back Pain (Legs, arms, neck).  Pain  Associated symptoms include weakness.   Back Pain  Associated symptoms include numbness and weakness.     Patient here today to discuss management of his chronic neck pain low back pain peripheral neuropathy and phantom limb pain.  The patient reports that he has been suffering with complications secondary to using CPAP machines and is currently undergoing investigation treatment in litigation.  He reports that it is causing him upper respiratory and lung issues as well as cardiac issues.  He recently had a PET scan which showed significant blockages in his heart and he is now following with pulmonology and cardiology.  He does believe that the CPAP machine is the source of his allover body pain which is extreme.  He does not understand that there is no potential treatment for this at this time.  He has suffered with significant orthopedic traumas in the past.  Last cervical imaging was in 2023.  He has not had any neurosurgical or orthopedic consultations in several years.    At this time we have had many discussions about the patient weaning off of his THC versus his opioids.  The patient reports that he does need to take his opioid and does wish to increase his opioids to 15 mg 4 times per day.  In a conversation with them he does understand that the practice would not be increasing the opioids.  This is the medication that he was on with his previous prescriber Dr. Bray and has been under the assumption for the last 2 years that we would not be dose escalating beyond this.  The patient did voice understanding but is frustrated.  He would like to go back and see a doctor that was prescribing his medications and the family medicine department at [facility] for consultation.  He has been using edible THC from a dispensary last prescription was in August as reported.  He reports his pain  today is a 7 out of 10.    Review of Systems   Constitutional: Negative.    HENT: Negative.     Eyes: Negative.    Respiratory: Negative.     Cardiovascular: Negative.    Gastrointestinal: Negative.    Endocrine: Negative.    Genitourinary: Negative.    Musculoskeletal:  Positive for back pain, myalgias and neck pain.        Left foot pain     Skin: Negative.    Allergic/Immunologic: Negative.    Neurological:  Positive for weakness and numbness.   Hematological: Negative.    Psychiatric/Behavioral: Negative.         Objective   Physical Exam  Vitals and nursing note reviewed.   Constitutional:       Appearance: Normal appearance.   HENT:      Head: Normocephalic and atraumatic.      Right Ear: Ear canal and external ear normal.      Left Ear: Ear canal and external ear normal.      Nose: Nose normal.      Mouth/Throat:      Mouth: Mucous membranes are moist.      Pharynx: Oropharynx is clear.   Eyes:      Conjunctiva/sclera: Conjunctivae normal.      Pupils: Pupils are equal, round, and reactive to light.   Cardiovascular:      Rate and Rhythm: Normal rate.   Pulmonary:      Effort: Pulmonary effort is normal. No respiratory distress.   Musculoskeletal:      Cervical back: Neck supple. Spasms and tenderness present. Pain with movement present. Decreased range of motion.      Lumbar back: Tenderness present. Normal range of motion.      Left foot: Decreased range of motion.        Legs:       Comments: RIGHT bka - WOUND       Right Lower Extremity: Right leg is amputated below knee.   Feet:      Left foot:      Skin integrity: Erythema present.   Skin:     General: Skin is warm and dry.   Neurological:      Mental Status: He is alert.      Cranial Nerves: Cranial nerves 2-12 are intact.      Sensory: Sensory deficit present.      Motor: Weakness (hand) present.      Coordination: Coordination is intact.      Gait: Gait is intact.      Deep Tendon Reflexes:      Reflex Scores:       Bicep reflexes are 1+ on the right  side and 1+ on the left side.       Brachioradialis reflexes are 1+ on the right side and 1+ on the left side.  Psychiatric:         Mood and Affect: Mood normal.         Thought Content: Thought content normal.         Assessment/Plan   Problem List Items Addressed This Visit             ICD-10-CM    Painful diabetic neuropathy (Multi) E11.40    Cervical postlaminectomy syndrome M96.1     Other Visit Diagnoses         Codes    Other chronic pain    -  Primary G89.29    Postlaminectomy syndrome, cervical region     M96.1             I nice discussion with the patient today our plan will be as follows.    Radiology: All available imaging was reviewed today.    Physically: Patient should continue home exercise program.    Psychologically: No issues at this time.    Medication: I will refill the patient's opioids today for 2 month.  The patient continues to see benefit and improvement in their quality of life and ability to maintain ADLs.  Patient educated about the risks of taking opioids and operating a motor vehicle.  Patient reports no adverse side effects to current medication regimen.  Current regimen does allow patient to maintain ADLs.  Patient reports no new neurologic symptoms, new pain areas, or exacerbation in pain today.  Patient reports they are happy with current treatment care path.    [place]AR[place] was reviewed and was consistent with the history.    Patient has been educated on the risks, benefits, and alternatives of controlled substances as well as the proper way to store these medications.  The patient and I discussed the nature of this medication and its side effects.  We discussed tolerance, physical dependence, psychological dependence, addiction and opioid-induced hyperalgesia.  We discussed the potential need to wean from this medication.  We discussed the availability of programs that can help with this process if necessary.  We discussed safety issues related to opioids including safe storage.  We  discussed the fact that the patient should not drive an automobile or operate heavy machinery while taking this medication.  A prescription for naloxone was offered to the patient.  The patient will be re-evaluated for the need to continue opioid therapy in 60-90 days.  The patient does voice understanding about the current situation.  I talked with him at length that if he did wish to have higher doses of narcotics and be managed differently he would have to seek a different provider.  The patient did voiced understanding to the situation.  The patient did state that he would cease using THC moving forward.  A urine or saliva specimen was obtained for toxicology screening      Duration: Greater than 1 year.    Intervention: I did discuss with him if he was open to undergoing consultation with neurosurgery and getting new examinations of his lumbar spine as well as considering new surgical or interventional pain opportunities to help control some of his pain.  The patient stated he had tried all these things many years ago and at this time did not want to proceed forward with any of these options.          Please note that this report has been produced using speech recognition software. It may contain errors related to grammar, punctuation or spelling. Electronically signed, but not reviewed.       Tomi Miranda MD 11/25/24 3:55 PM

## 2024-11-25 NOTE — PROGRESS NOTES
MEDICATION NAME: Oxycodone  STRENGTH:  mg   LAST FILL DATE: 24  DATE LAST TAKEN: 24  QUANTITY FILLED: 12  QUANTITY REMAININ  COUNT COMPLETED BY: BABAK MOODY MA and IRAIS RODRIGUEZ RN      UDS LAST COMPLETED:   CONTROLLED SUBSTANCES AGREEMENT LAST SIGNED:   ORT LAST COMPLETED:  Modified [employer] disability form filled out annually.

## 2024-11-29 DIAGNOSIS — F51.01 PRIMARY INSOMNIA: ICD-10-CM

## 2024-11-29 RX ORDER — TRAZODONE HYDROCHLORIDE 100 MG/1
100 TABLET ORAL NIGHTLY
Qty: 90 TABLET | Refills: 1 | Status: SHIPPED | OUTPATIENT
Start: 2024-11-29 | End: 2025-05-28

## 2024-12-03 ENCOUNTER — PHARMACY VISIT (OUTPATIENT)
Dept: PHARMACY | Facility: CLINIC | Age: 54
End: 2024-12-03
Payer: MEDICARE

## 2024-12-03 PROCEDURE — RXMED WILLOW AMBULATORY MEDICATION CHARGE

## 2024-12-06 LAB — SCAN RESULT: NORMAL

## 2024-12-23 PROCEDURE — RXMED WILLOW AMBULATORY MEDICATION CHARGE

## 2024-12-24 ENCOUNTER — PHARMACY VISIT (OUTPATIENT)
Dept: PHARMACY | Facility: CLINIC | Age: 54
End: 2024-12-24
Payer: MEDICARE

## 2024-12-24 PROCEDURE — RXMED WILLOW AMBULATORY MEDICATION CHARGE

## 2025-01-21 ENCOUNTER — OFFICE VISIT (OUTPATIENT)
Dept: PAIN MEDICINE | Facility: CLINIC | Age: 55
End: 2025-01-21
Payer: MEDICARE

## 2025-01-21 VITALS
BODY MASS INDEX: 27.98 KG/M2 | WEIGHT: 218 LBS | HEIGHT: 74 IN | RESPIRATION RATE: 16 BRPM | DIASTOLIC BLOOD PRESSURE: 70 MMHG | SYSTOLIC BLOOD PRESSURE: 120 MMHG

## 2025-01-21 DIAGNOSIS — M96.1 POSTLAMINECTOMY SYNDROME, CERVICAL REGION: ICD-10-CM

## 2025-01-21 DIAGNOSIS — E11.40 PAINFUL DIABETIC NEUROPATHY (MULTI): ICD-10-CM

## 2025-01-21 DIAGNOSIS — G89.29 OTHER CHRONIC PAIN: Primary | ICD-10-CM

## 2025-01-21 DIAGNOSIS — M96.1 CERVICAL POSTLAMINECTOMY SYNDROME: ICD-10-CM

## 2025-01-21 PROCEDURE — 99214 OFFICE O/P EST MOD 30 MIN: CPT | Performed by: ANESTHESIOLOGY

## 2025-01-21 PROCEDURE — 1036F TOBACCO NON-USER: CPT | Performed by: ANESTHESIOLOGY

## 2025-01-21 PROCEDURE — 3008F BODY MASS INDEX DOCD: CPT | Performed by: ANESTHESIOLOGY

## 2025-01-21 PROCEDURE — 3074F SYST BP LT 130 MM HG: CPT | Performed by: ANESTHESIOLOGY

## 2025-01-21 PROCEDURE — 3078F DIAST BP <80 MM HG: CPT | Performed by: ANESTHESIOLOGY

## 2025-01-21 PROCEDURE — G2211 COMPLEX E/M VISIT ADD ON: HCPCS | Performed by: ANESTHESIOLOGY

## 2025-01-21 PROCEDURE — 4010F ACE/ARB THERAPY RXD/TAKEN: CPT | Performed by: ANESTHESIOLOGY

## 2025-01-21 ASSESSMENT — ENCOUNTER SYMPTOMS
PSYCHIATRIC NEGATIVE: 1
NUMBNESS: 1
NECK PAIN: 1
CONSTITUTIONAL NEGATIVE: 1
HEMATOLOGIC/LYMPHATIC NEGATIVE: 1
EYES NEGATIVE: 1
CARDIOVASCULAR NEGATIVE: 1
WEAKNESS: 1
BACK PAIN: 1
GASTROINTESTINAL NEGATIVE: 1
RESPIRATORY NEGATIVE: 1
ALLERGIC/IMMUNOLOGIC NEGATIVE: 1
ENDOCRINE NEGATIVE: 1
LEG PAIN: 1
MYALGIAS: 1

## 2025-01-21 ASSESSMENT — PAIN - FUNCTIONAL ASSESSMENT: PAIN_FUNCTIONAL_ASSESSMENT: 0-10

## 2025-01-21 ASSESSMENT — PAIN SCALES - GENERAL
PAINLEVEL_OUTOF10: 8
PAINLEVEL_OUTOF10: 8

## 2025-01-21 NOTE — PROGRESS NOTES
Subjective   Patient ID: Geoffrey Dan is a 54 y.o. male who presents for Diabetic Neuropathy, Leg Pain, and Neck Pain.  Leg Pain   Associated symptoms include numbness.   Neck Pain   Associated symptoms include leg pain, numbness and weakness.   Patient here today for follow up today.  He is rating his pain as a 8/10 today.  He suffers with chronic neck pain, left leg pain, and neuropathy from DM. He has been looking into some other non-medication options fro his pain.  He has been trying to be more calm and staying more positive and eating better.    The patient questions whether he could move to a more long-acting medication.  He states he does not really mind if the dose does not change but would like to not have to focus on taking medications 4 times per day.  He states that he is always focused on the medication wearing off and feels that moving to long-acting might be a better option for him.  Patient suffers with chronic neck pain after neck fusion surgery.  He has lower extremity amputation pain as well as painful diabetic neuropathy.  He continues to work on his health and his diabetes and getting better control of his blood pressure.  The diabetes still continues to have up-and-down numbers.  He associates this with his pain sometimes.    Review of Systems   Constitutional: Negative.    HENT: Negative.     Eyes: Negative.    Respiratory: Negative.     Cardiovascular: Negative.    Gastrointestinal: Negative.    Endocrine: Negative.    Genitourinary: Negative.    Musculoskeletal:  Positive for back pain, myalgias and neck pain.        Left foot pain     Skin: Negative.    Allergic/Immunologic: Negative.    Neurological:  Positive for weakness and numbness.   Hematological: Negative.    Psychiatric/Behavioral: Negative.         Objective   Physical Exam  Vitals and nursing note reviewed.   Constitutional:       Appearance: Normal appearance.   HENT:      Head: Normocephalic and atraumatic.      Right Ear: Ear  canal and external ear normal.      Left Ear: Ear canal and external ear normal.      Nose: Nose normal.      Mouth/Throat:      Mouth: Mucous membranes are moist.      Pharynx: Oropharynx is clear.   Eyes:      Conjunctiva/sclera: Conjunctivae normal.      Pupils: Pupils are equal, round, and reactive to light.   Cardiovascular:      Rate and Rhythm: Normal rate.   Pulmonary:      Effort: Pulmonary effort is normal. No respiratory distress.   Musculoskeletal:      Cervical back: Neck supple. Spasms and tenderness present. Pain with movement present. Decreased range of motion.      Lumbar back: Tenderness present. Normal range of motion.      Left foot: Decreased range of motion.        Legs:       Comments: RIGHT bka - WOUND       Right Lower Extremity: Right leg is amputated below knee.   Feet:      Left foot:      Skin integrity: Erythema present.   Skin:     General: Skin is warm and dry.   Neurological:      Mental Status: He is alert.      Cranial Nerves: Cranial nerves 2-12 are intact.      Sensory: Sensory deficit present.      Motor: Weakness (hand) present.      Coordination: Coordination is intact.      Gait: Gait is intact.      Deep Tendon Reflexes:      Reflex Scores:       Bicep reflexes are 1+ on the right side and 1+ on the left side.       Brachioradialis reflexes are 1+ on the right side and 1+ on the left side.  Psychiatric:         Mood and Affect: Mood normal.         Thought Content: Thought content normal.         Assessment/Plan   Problem List Items Addressed This Visit             ICD-10-CM    Painful diabetic neuropathy (Multi) E11.40    Relevant Medications    oxyCODONE myristate (Xtampza ER) 18 mg 12 hr abuse-deterrent capsule (Start on 1/22/2025)    Cervical postlaminectomy syndrome M96.1    Relevant Medications    oxyCODONE myristate (Xtampza ER) 18 mg 12 hr abuse-deterrent capsule (Start on 1/22/2025)     Other Visit Diagnoses         Codes    Other chronic pain    -  Primary G89.29     Relevant Medications    oxyCODONE myristate (Xtampza ER) 18 mg 12 hr abuse-deterrent capsule (Start on 1/22/2025)    Postlaminectomy syndrome, cervical region     M96.1    Relevant Medications    oxyCODONE myristate (Xtampza ER) 18 mg 12 hr abuse-deterrent capsule (Start on 1/22/2025)          I nice discussion with the patient today our plan will be as follows.    Radiology: All available imaging was reviewed today.    Physically: Patient should continue home exercise program.    Psychologically: No issues at this time.    Medication: I will refill the patient's opioids today for 1 month.  I will move the patient over to Xtampza 18 mg twice a day for long-acting option.  Overall this is not an increase in his total MME.  The patient continues to see benefit and improvement in their quality of life and ability to maintain ADLs.  Patient educated about the risks of taking opioids and operating a motor vehicle.    Patient reports no new neurologic symptoms, new pain areas, or exacerbation in pain today.  Patient reports they are happy with current treatment care path.    OARRS was reviewed and was consistent with the history.    Patient has been educated on the risks, benefits, and alternatives of controlled substances as well as the proper way to store these medications.  The patient and I discussed the nature of this medication and its side effects.  We discussed tolerance, physical dependence, psychological dependence, addiction and opioid-induced hyperalgesia.  We discussed the potential need to wean from this medication.  We discussed the availability of programs that can help with this process if necessary.  We discussed safety issues related to opioids including safe storage.  We discussed the fact that the patient should not drive an automobile or operate heavy machinery while taking this medication.  A prescription for naloxone was offered to the patient.  The patient will be re-evaluated for the need to  continue opioid therapy in 60-90 days.  The patient did have a drug screen completed at the last office visit.  He was positive for THC.  We did discuss this and he understands he can no longer use medical THC.    Duration: Greater than 1 year.    Intervention: No interventions at this time we will see how the patient does with his new regimen of medication.          Please note that this report has been produced using speech recognition software. It may contain errors related to grammar, punctuation or spelling. Electronically signed, but not reviewed.          Tomi Miranda MD 01/21/25 3:46 PM

## 2025-01-21 NOTE — PROGRESS NOTES
MEDICATION NAME: PERCOCET  STRENGTH: 10/325MG  LAST FILL DATE: 12/24/24  DATE LAST TAKEN: 1/21/25  QUANTITY FILLED: 120  QUANTITY REMAINING: 10  COUNT COMPLETED BY: BABAK MOODY MA and СЕРГЕЙ NORTON RN      UDS LAST COMPLETED: 11/2024  CONTROLLED SUBSTANCES AGREEMENT LAST SIGNED: 2/2024  ORT LAST COMPLETED:12/2023  Modified [employer] disability form filled out annually.

## 2025-01-22 PROCEDURE — RXMED WILLOW AMBULATORY MEDICATION CHARGE

## 2025-01-23 ENCOUNTER — PHARMACY VISIT (OUTPATIENT)
Dept: PHARMACY | Facility: CLINIC | Age: 55
End: 2025-01-23
Payer: MEDICARE

## 2025-01-23 DIAGNOSIS — M96.1 POSTLAMINECTOMY SYNDROME, CERVICAL REGION: Primary | ICD-10-CM

## 2025-01-23 PROCEDURE — RXMED WILLOW AMBULATORY MEDICATION CHARGE

## 2025-01-23 RX ORDER — OXYCODONE AND ACETAMINOPHEN 10; 325 MG/1; MG/1
1 TABLET ORAL EVERY 6 HOURS PRN
Qty: 28 TABLET | Refills: 0 | Status: SHIPPED | OUTPATIENT
Start: 2025-01-23 | End: 2025-01-30

## 2025-01-29 PROCEDURE — RXMED WILLOW AMBULATORY MEDICATION CHARGE

## 2025-01-30 ENCOUNTER — PHARMACY VISIT (OUTPATIENT)
Dept: PHARMACY | Facility: CLINIC | Age: 55
End: 2025-01-30
Payer: MEDICARE

## 2025-02-04 DIAGNOSIS — F41.9 ANXIETY: ICD-10-CM

## 2025-02-04 RX ORDER — ALPRAZOLAM 1 MG/1
1 TABLET ORAL EVERY 8 HOURS PRN
Qty: 90 TABLET | Refills: 2 | Status: SHIPPED | OUTPATIENT
Start: 2025-02-04

## 2025-02-04 NOTE — TELEPHONE ENCOUNTER
LV 11/6/24, [place] 3/24/25    Alprazolam 1mg    [address] [facility]

## 2025-02-25 DIAGNOSIS — M96.1 POSTLAMINECTOMY SYNDROME, CERVICAL REGION: ICD-10-CM

## 2025-02-25 DIAGNOSIS — E11.40 PAINFUL DIABETIC NEUROPATHY (MULTI): ICD-10-CM

## 2025-02-25 DIAGNOSIS — G89.29 OTHER CHRONIC PAIN: ICD-10-CM

## 2025-02-25 DIAGNOSIS — M96.1 CERVICAL POSTLAMINECTOMY SYNDROME: ICD-10-CM

## 2025-02-25 PROCEDURE — RXMED WILLOW AMBULATORY MEDICATION CHARGE

## 2025-02-26 ENCOUNTER — PHARMACY VISIT (OUTPATIENT)
Dept: PHARMACY | Facility: CLINIC | Age: 55
End: 2025-02-26
Payer: MEDICARE

## 2025-02-26 PROCEDURE — RXMED WILLOW AMBULATORY MEDICATION CHARGE

## 2025-03-18 ENCOUNTER — APPOINTMENT (OUTPATIENT)
Dept: PAIN MEDICINE | Facility: CLINIC | Age: 55
End: 2025-03-18
Payer: MEDICARE

## 2025-03-18 VITALS
SYSTOLIC BLOOD PRESSURE: 130 MMHG | DIASTOLIC BLOOD PRESSURE: 90 MMHG | HEIGHT: 74 IN | RESPIRATION RATE: 16 BRPM | BODY MASS INDEX: 27.98 KG/M2 | WEIGHT: 218 LBS

## 2025-03-18 DIAGNOSIS — G89.29 OTHER CHRONIC PAIN: ICD-10-CM

## 2025-03-18 DIAGNOSIS — M96.1 CERVICAL POSTLAMINECTOMY SYNDROME: ICD-10-CM

## 2025-03-18 DIAGNOSIS — E11.40 PAINFUL DIABETIC NEUROPATHY (MULTI): ICD-10-CM

## 2025-03-18 DIAGNOSIS — M96.1 POSTLAMINECTOMY SYNDROME, CERVICAL REGION: ICD-10-CM

## 2025-03-18 DIAGNOSIS — Z79.899 ENCOUNTER FOR LONG-TERM (CURRENT) USE OF HIGH-RISK MEDICATION: Primary | ICD-10-CM

## 2025-03-18 PROCEDURE — 3080F DIAST BP >= 90 MM HG: CPT | Performed by: ANESTHESIOLOGY

## 2025-03-18 PROCEDURE — 4010F ACE/ARB THERAPY RXD/TAKEN: CPT | Performed by: ANESTHESIOLOGY

## 2025-03-18 PROCEDURE — 3075F SYST BP GE 130 - 139MM HG: CPT | Performed by: ANESTHESIOLOGY

## 2025-03-18 PROCEDURE — 99214 OFFICE O/P EST MOD 30 MIN: CPT | Performed by: ANESTHESIOLOGY

## 2025-03-18 PROCEDURE — G2211 COMPLEX E/M VISIT ADD ON: HCPCS | Performed by: ANESTHESIOLOGY

## 2025-03-18 PROCEDURE — 3008F BODY MASS INDEX DOCD: CPT | Performed by: ANESTHESIOLOGY

## 2025-03-18 ASSESSMENT — PAIN SCALES - GENERAL
PAINLEVEL_OUTOF10: 8
PAINLEVEL_OUTOF10: 8

## 2025-03-18 ASSESSMENT — PAIN - FUNCTIONAL ASSESSMENT: PAIN_FUNCTIONAL_ASSESSMENT: 0-10

## 2025-03-18 ASSESSMENT — PATIENT HEALTH QUESTIONNAIRE - PHQ9
2. FEELING DOWN, DEPRESSED OR HOPELESS: NOT AT ALL
1. LITTLE INTEREST OR PLEASURE IN DOING THINGS: NOT AT ALL
SUM OF ALL RESPONSES TO PHQ9 QUESTIONS 1 AND 2: 0

## 2025-03-18 ASSESSMENT — PAIN DESCRIPTION - DESCRIPTORS: DESCRIPTORS: ACHING

## 2025-03-18 NOTE — PROGRESS NOTES
MEDICATION NAME: XTAMPZA ER  STRENGTH: 18MG  LAST FILL DATE: 25  DATE LAST TAKEN: 3/18/25  QUANTITY FILLED: 60  QUANTITY REMAININ  COUNT COMPLETED BY: BABAK MOODY MA and СЕРГЕЙ NORTON RN      UDS LAST COMPLETED: 2024  CONTROLLED SUBSTANCES AGREEMENT LAST SIGNED: 2024  ORT LAST COMPLETED:2023  Modified [employer] disability form filled out annually.

## 2025-03-18 NOTE — PROGRESS NOTES
Subjective   Patient ID: Geoffrey Dan is a 54 y.o. male who presents for Neck Pain and Leg Pain.  HPI  Patient here today for management of his multiple chronic pain issues including neck pain and leg pain after amputation.  Patient was transitioned over to long-acting oxycodone, Xtampza, last month.  He had a hard time tolerating the capsules he spoke with his pharmacist who told him to open up the capsule and sprinkle it on his food however he did not feel that it was effective.  He continues to discuss that he wants stronger opioids and that would be better for him.  He has had chronic pain for many years and has undergone numerous different orthopedic injuries interventions and surgeries.  He continues to follow-up with his neurosurgeon there are no surgical options for his neck issues and his current pathology.  He suffers with phantom limb pain after his amputation.  He reports he has no quality of life.  He has very little ability to sleep and he gets extremely exhausted because he is only sleeping 2 hours per night.  He does aspire to have a better life.  He wants to be more active and be able to exercise.    Pain Score:   8     Review of Systems   Constitutional: Negative.    HENT: Negative.     Eyes: Negative.    Respiratory: Negative.     Cardiovascular: Negative.    Gastrointestinal: Negative.    Endocrine: Negative.    Genitourinary: Negative.    Musculoskeletal:  Positive for back pain, myalgias and neck pain.        Left foot pain     Skin: Negative.    Allergic/Immunologic: Negative.    Neurological:  Positive for weakness and numbness.   Hematological: Negative.    Psychiatric/Behavioral: Negative.         Objective   Physical Exam  Vitals and nursing note reviewed.   Constitutional:       Appearance: Normal appearance.   HENT:      Head: Normocephalic and atraumatic.      Right Ear: Ear canal and external ear normal.      Left Ear: Ear canal and external ear normal.      Nose: Nose normal.       Mouth/Throat:      Mouth: Mucous membranes are moist.      Pharynx: Oropharynx is clear.   Eyes:      Conjunctiva/sclera: Conjunctivae normal.      Pupils: Pupils are equal, round, and reactive to light.   Cardiovascular:      Rate and Rhythm: Normal rate.   Pulmonary:      Effort: Pulmonary effort is normal. No respiratory distress.   Musculoskeletal:      Cervical back: Neck supple. Spasms and tenderness present. Pain with movement present. Decreased range of motion.      Lumbar back: Tenderness present. Normal range of motion.      Left foot: Decreased range of motion.        Legs:       Comments: RIGHT bka - WOUND       Right Lower Extremity: Right leg is amputated below knee.   Feet:      Left foot:      Skin integrity: Erythema present.   Skin:     General: Skin is warm and dry.   Neurological:      Mental Status: He is alert.      Cranial Nerves: Cranial nerves 2-12 are intact.      Sensory: Sensory deficit present.      Motor: Weakness (hand) present.      Coordination: Coordination is intact.      Gait: Gait is intact.      Deep Tendon Reflexes:      Reflex Scores:       Bicep reflexes are 1+ on the right side and 1+ on the left side.       Brachioradialis reflexes are 1+ on the right side and 1+ on the left side.  Psychiatric:         Mood and Affect: Mood normal.         Thought Content: Thought content normal.         Assessment/Plan   Problem List Items Addressed This Visit             ICD-10-CM    Painful diabetic neuropathy (Multi) E11.40    Cervical postlaminectomy syndrome M96.1     Other Visit Diagnoses         Codes    Encounter for long-term (current) use of high-risk medication    -  Primary Z79.899    Relevant Orders    Opiate/Opioid/Benzo Prescription Compliance    Postlaminectomy syndrome, cervical region     M96.1    Other chronic pain     G89.29             I nice discussion with the patient today our plan will be as follows.    Radiology: All available imaging was reviewed  today.    Physically: Patient should continue home exercise program.    Psychologically: No issues at this time.    Medication:  I will refill the patient's opioids today for 1 month.  The patient continues to see benefit and improvement in their quality of life and ability to maintain ADLs.  Patient educated about the risks of taking opioids and operating a motor vehicle.  Patient reports no adverse side effects to current medication regimen.  Current regimen does allow patient to maintain ADLs.  Patient reports no new neurologic symptoms, new pain areas, or exacerbation in pain today.  Patient reports they are happy with current treatment care path.    OARRS was reviewed and was consistent with the history.    Patient has been educated on the risks, benefits, and alternatives of controlled substances as well as the proper way to store these medications.  The patient and I discussed the nature of this medication and its side effects.  We discussed tolerance, physical dependence, psychological dependence, addiction and opioid-induced hyperalgesia.  We discussed the potential need to wean from this medication.  We discussed the availability of programs that can help with this process if necessary.  We discussed safety issues related to opioids including safe storage.  We discussed the fact that the patient should not drive an automobile or operate heavy machinery while taking this medication.  A prescription for naloxone was offered to the patient.  The patient will be re-evaluated for the need to continue opioid therapy in 60-90 days.  A urine or saliva specimen was obtained for toxicology screening  A opioid agreement and care plan was presented to the patient today.  The patient had the opportunity to read through the agreement during the office visit and has signed the agreement today.  A copy of the agreement was given to the patient to take home for their records.        Duration: Greater than 1  year.    Intervention: No intervention at this time.    I do long discussion with the patient today about the limitations of what our practice will prescribe him.  I told him that he is free to go get a second opinion with a different practice to see if they would be comfortable taking him on for medication management at a different amount.  The patient did voiced understanding and said that he would think about this and potentially get appointments to get second opinions.          Please note that this report has been produced using speech recognition software. It may contain errors related to grammar, punctuation or spelling. Electronically signed, but not reviewed.       Tomi Miranda MD 03/18/25 3:55 PM

## 2025-03-20 LAB
1OH-MIDAZOLAM UR-MCNC: NEGATIVE NG/ML
7AMINOCLONAZEPAM UR-MCNC: NEGATIVE NG/ML
A-OH ALPRAZ UR-MCNC: 109 NG/ML
A-OH-TRIAZOLAM UR-MCNC: NEGATIVE NG/ML
AMPHETAMINES UR QL: NEGATIVE NG/ML
BARBITURATES UR QL: NEGATIVE NG/ML
BZE UR QL: NEGATIVE NG/ML
CODEINE UR-MCNC: NEGATIVE NG/ML
CREAT UR-MCNC: 22.5 MG/DL
DRUG SCREEN COMMENT UR-IMP: ABNORMAL
EDDP UR-MCNC: NEGATIVE NG/ML
FENTANYL UR-MCNC: NEGATIVE NG/ML
HYDROCODONE UR-MCNC: NEGATIVE NG/ML
HYDROMORPHONE UR-MCNC: NEGATIVE NG/ML
LORAZEPAM UR-MCNC: NEGATIVE NG/ML
METHADONE UR-MCNC: NEGATIVE NG/ML
MORPHINE UR-MCNC: NEGATIVE NG/ML
NORDIAZEPAM UR-MCNC: NEGATIVE NG/ML
NORFENTANYL UR-MCNC: NEGATIVE NG/ML
NORHYDROCODONE UR CFM-MCNC: NEGATIVE NG/ML
NOROXYCODONE UR CFM-MCNC: 1130 NG/ML
NORTRAMADOL UR-MCNC: NEGATIVE NG/ML
OH-ETHYLFLURAZ UR-MCNC: NEGATIVE NG/ML
OXAZEPAM UR-MCNC: NEGATIVE NG/ML
OXIDANTS UR QL: NEGATIVE MCG/ML
OXYCODONE UR CFM-MCNC: 924 NG/ML
OXYMORPHONE UR CFM-MCNC: 300 NG/ML
PCP UR QL: NEGATIVE NG/ML
PH UR: 6.1 [PH] (ref 4.5–9)
QUEST 6 ACETYLMORPHINE: NEGATIVE NG/ML
QUEST NOTES AND COMMENTS: ABNORMAL
QUEST ZOLPIDEM: NEGATIVE NG/ML
TEMAZEPAM UR-MCNC: NEGATIVE NG/ML
THC UR QL: POSITIVE NG/ML
THC UR-MCNC: 17 NG/ML
TRAMADOL UR-MCNC: NEGATIVE NG/ML
ZOLPIDEM PHENYL-4-CARB UR CFM-MCNC: NEGATIVE NG/ML

## 2025-03-20 ASSESSMENT — ENCOUNTER SYMPTOMS
GASTROINTESTINAL NEGATIVE: 1
HEMATOLOGIC/LYMPHATIC NEGATIVE: 1
SHORTNESS OF BREATH: 0
ENDOCRINE NEGATIVE: 1
ALLERGIC/IMMUNOLOGIC NEGATIVE: 1
BACK PAIN: 1
PSYCHIATRIC NEGATIVE: 1
CARDIOVASCULAR NEGATIVE: 1
FEVER: 0
NECK PAIN: 1
WEAKNESS: 1
CONSTITUTIONAL NEGATIVE: 1
RESPIRATORY NEGATIVE: 1
ABDOMINAL PAIN: 0
MYALGIAS: 1
EYES NEGATIVE: 1
NUMBNESS: 1

## 2025-03-24 ENCOUNTER — OFFICE VISIT (OUTPATIENT)
Dept: PRIMARY CARE | Facility: CLINIC | Age: 55
End: 2025-03-24
Payer: MEDICARE

## 2025-03-24 VITALS
WEIGHT: 219 LBS | TEMPERATURE: 98.2 F | SYSTOLIC BLOOD PRESSURE: 118 MMHG | OXYGEN SATURATION: 95 % | DIASTOLIC BLOOD PRESSURE: 78 MMHG | HEART RATE: 111 BPM | BODY MASS INDEX: 28.12 KG/M2

## 2025-03-24 DIAGNOSIS — Z12.5 ENCOUNTER FOR PROSTATE CANCER SCREENING: ICD-10-CM

## 2025-03-24 DIAGNOSIS — I25.10 CORONARY ARTERY DISEASE DUE TO LIPID RICH PLAQUE: ICD-10-CM

## 2025-03-24 DIAGNOSIS — Z89.9 STATUS POST AMPUTATION (HHS-HCC): ICD-10-CM

## 2025-03-24 DIAGNOSIS — F41.9 ANXIETY: ICD-10-CM

## 2025-03-24 DIAGNOSIS — J43.9 PULMONARY EMPHYSEMA, UNSPECIFIED EMPHYSEMA TYPE (MULTI): ICD-10-CM

## 2025-03-24 DIAGNOSIS — R73.9 HYPERGLYCEMIA: ICD-10-CM

## 2025-03-24 DIAGNOSIS — E11.40 TYPE 2 DIABETES MELLITUS WITH DIABETIC NEUROPATHY, WITH LONG-TERM CURRENT USE OF INSULIN: Primary | ICD-10-CM

## 2025-03-24 DIAGNOSIS — E55.9 VITAMIN D DEFICIENCY: ICD-10-CM

## 2025-03-24 DIAGNOSIS — Z79.4 TYPE 2 DIABETES MELLITUS WITH DIABETIC NEUROPATHY, WITH LONG-TERM CURRENT USE OF INSULIN: Primary | ICD-10-CM

## 2025-03-24 DIAGNOSIS — I10 PRIMARY HYPERTENSION: ICD-10-CM

## 2025-03-24 DIAGNOSIS — G47.33 OSA (OBSTRUCTIVE SLEEP APNEA): ICD-10-CM

## 2025-03-24 DIAGNOSIS — E78.2 MIXED HYPERLIPIDEMIA: ICD-10-CM

## 2025-03-24 DIAGNOSIS — I25.83 CORONARY ARTERY DISEASE DUE TO LIPID RICH PLAQUE: ICD-10-CM

## 2025-03-24 DIAGNOSIS — Z79.899 LONG-TERM CURRENT USE OF BENZODIAZEPINE: ICD-10-CM

## 2025-03-24 LAB — POC HEMOGLOBIN A1C: 5.9 % (ref 4.2–6.5)

## 2025-03-24 PROCEDURE — 83036 HEMOGLOBIN GLYCOSYLATED A1C: CPT | Performed by: INTERNAL MEDICINE

## 2025-03-24 PROCEDURE — G2211 COMPLEX E/M VISIT ADD ON: HCPCS | Performed by: INTERNAL MEDICINE

## 2025-03-24 PROCEDURE — 99214 OFFICE O/P EST MOD 30 MIN: CPT | Performed by: INTERNAL MEDICINE

## 2025-03-24 PROCEDURE — 3078F DIAST BP <80 MM HG: CPT | Performed by: INTERNAL MEDICINE

## 2025-03-24 PROCEDURE — 95251 CONT GLUC MNTR ANALYSIS I&R: CPT | Performed by: INTERNAL MEDICINE

## 2025-03-24 PROCEDURE — 4010F ACE/ARB THERAPY RXD/TAKEN: CPT | Performed by: INTERNAL MEDICINE

## 2025-03-24 PROCEDURE — 3074F SYST BP LT 130 MM HG: CPT | Performed by: INTERNAL MEDICINE

## 2025-03-24 RX ORDER — ALBUTEROL SULFATE 0.83 MG/ML
3 SOLUTION RESPIRATORY (INHALATION) ONCE
OUTPATIENT
Start: 2025-03-24 | End: 2025-03-24

## 2025-03-24 RX ORDER — ALBUTEROL SULFATE 90 UG/1
4 INHALANT RESPIRATORY (INHALATION) ONCE
OUTPATIENT
Start: 2025-03-24

## 2025-03-24 ASSESSMENT — PAIN SCALES - GENERAL: PAINLEVEL_OUTOF10: 8

## 2025-03-24 NOTE — LETTER
March 27, 2025     Geoffrey Dan  [address]  [facility] [address]      Dear Mr. Dan:    Below are the results from your recent visit:    Resulted Orders   POCT glycosylated hemoglobin (Hb A1C) manually resulted   Result Value Ref Range    POC HEMOGLOBIN A1c 5.9 4.2 - 6.5 %   CBC and Auto Differential   Result Value Ref Range    WHITE BLOOD CELL COUNT 5.8 3.8 - 10.8 Thousand/uL    RED BLOOD CELL COUNT 5.16 4.20 - 5.80 Million/uL    HEMOGLOBIN 15.7 13.2 - 17.1 g/dL    HEMATOCRIT 46.4 38.5 - 50.0 %    MCV 89.9 80.0 - 100.0 fL    MCH 30.4 27.0 - 33.0 pg    MCHC 33.8 32.0 - 36.0 g/dL      Comment:      For adults, a slight decrease in the calculated MCHC  value (in the range of 30 to 32 g/dL) is most likely  not clinically significant; however, it should be  interpreted with caution in correlation with other  red cell parameters and the patient's clinical  condition.      RDW 11.7 11.0 - 15.0 %    PLATELET COUNT 289 140 - 400 Thousand/uL    MPV 10.3 7.5 - 12.5 fL    ABSOLUTE NEUTROPHILS 3,335 1,500 - 7,800 cells/uL    ABSOLUTE LYMPHOCYTES 1,427 850 - 3,900 cells/uL    ABSOLUTE MONOCYTES 632 200 - 950 cells/uL    ABSOLUTE EOSINOPHILS 284 15 - 500 cells/uL    ABSOLUTE BASOPHILS 122 0 - 200 cells/uL    NEUTROPHILS 57.5 %    LYMPHOCYTES 24.6 %    MONOCYTES 10.9 %    EOSINOPHILS 4.9 %    BASOPHILS 2.1 %    Narrative    FASTING:YES    FASTING: YES   Comprehensive Metabolic Panel   Result Value Ref Range    GLUCOSE 50 (L) 65 - 99 mg/dL      Comment:                    Fasting reference interval         UREA NITROGEN (BUN) 13 7 - 25 mg/dL    CREATININE 0.77 0.70 - 1.30 mg/dL    EGFR 106 > OR = 60 mL/min/1.73m2    SODIUM 139 135 - 146 mmol/L    POTASSIUM 4.1 3.5 - 5.3 mmol/L    CHLORIDE 98 98 - 110 mmol/L    CARBON DIOXIDE 30 20 - 32 mmol/L    ELECTROLYTE BALANCE 11 7 - 17 mmol/L (calc)    CALCIUM 9.5 8.6 - 10.3 mg/dL    PROTEIN, TOTAL 7.4 6.1 - 8.1 g/dL    ALBUMIN 4.7 3.6 - 5.1 g/dL    BILIRUBIN, TOTAL 0.4 0.2 - 1.2  mg/dL    ALKALINE PHOSPHATASE 69 35 - 144 U/L    AST 19 10 - 35 U/L    ALT 16 9 - 46 U/L    Narrative    FASTING:YES    FASTING: YES     Hi, Geoffrey.  These labs are normal.  Liver and kidney function are perfect.     If you have any questions or concerns, please don't hesitate to call.         Sincerely,  Dr Chas Cordova MD

## 2025-03-25 DIAGNOSIS — I10 PRIMARY HYPERTENSION: ICD-10-CM

## 2025-03-25 LAB
ALBUMIN SERPL-MCNC: 4.7 G/DL (ref 3.6–5.1)
ALP SERPL-CCNC: 69 U/L (ref 35–144)
ALT SERPL-CCNC: 16 U/L (ref 9–46)
ANION GAP SERPL CALCULATED.4IONS-SCNC: 11 MMOL/L (CALC) (ref 7–17)
AST SERPL-CCNC: 19 U/L (ref 10–35)
BASOPHILS # BLD AUTO: 122 CELLS/UL (ref 0–200)
BASOPHILS NFR BLD AUTO: 2.1 %
BILIRUB SERPL-MCNC: 0.4 MG/DL (ref 0.2–1.2)
BUN SERPL-MCNC: 13 MG/DL (ref 7–25)
CALCIUM SERPL-MCNC: 9.5 MG/DL (ref 8.6–10.3)
CHLORIDE SERPL-SCNC: 98 MMOL/L (ref 98–110)
CO2 SERPL-SCNC: 30 MMOL/L (ref 20–32)
CREAT SERPL-MCNC: 0.77 MG/DL (ref 0.7–1.3)
EGFRCR SERPLBLD CKD-EPI 2021: 106 ML/MIN/1.73M2
EOSINOPHIL # BLD AUTO: 284 CELLS/UL (ref 15–500)
EOSINOPHIL NFR BLD AUTO: 4.9 %
ERYTHROCYTE [DISTWIDTH] IN BLOOD BY AUTOMATED COUNT: 11.7 % (ref 11–15)
GLUCOSE SERPL-MCNC: 50 MG/DL (ref 65–99)
HCT VFR BLD AUTO: 46.4 % (ref 38.5–50)
HGB BLD-MCNC: 15.7 G/DL (ref 13.2–17.1)
LYMPHOCYTES # BLD AUTO: 1427 CELLS/UL (ref 850–3900)
LYMPHOCYTES NFR BLD AUTO: 24.6 %
MCH RBC QN AUTO: 30.4 PG (ref 27–33)
MCHC RBC AUTO-ENTMCNC: 33.8 G/DL (ref 32–36)
MCV RBC AUTO: 89.9 FL (ref 80–100)
MONOCYTES # BLD AUTO: 632 CELLS/UL (ref 200–950)
MONOCYTES NFR BLD AUTO: 10.9 %
NEUTROPHILS # BLD AUTO: 3335 CELLS/UL (ref 1500–7800)
NEUTROPHILS NFR BLD AUTO: 57.5 %
PLATELET # BLD AUTO: 289 THOUSAND/UL (ref 140–400)
PMV BLD REES-ECKER: 10.3 FL (ref 7.5–12.5)
POTASSIUM SERPL-SCNC: 4.1 MMOL/L (ref 3.5–5.3)
PROT SERPL-MCNC: 7.4 G/DL (ref 6.1–8.1)
RBC # BLD AUTO: 5.16 MILLION/UL (ref 4.2–5.8)
SODIUM SERPL-SCNC: 139 MMOL/L (ref 135–146)
WBC # BLD AUTO: 5.8 THOUSAND/UL (ref 3.8–10.8)

## 2025-03-26 RX ORDER — LISINOPRIL 10 MG/1
10 TABLET ORAL DAILY
Qty: 30 TABLET | Refills: 11 | Status: SHIPPED | OUTPATIENT
Start: 2025-03-26 | End: 2026-03-26

## 2025-03-27 PROCEDURE — RXMED WILLOW AMBULATORY MEDICATION CHARGE

## 2025-03-28 ENCOUNTER — PHARMACY VISIT (OUTPATIENT)
Dept: PHARMACY | Facility: CLINIC | Age: 55
End: 2025-03-28
Payer: MEDICARE

## 2025-03-28 PROCEDURE — RXMED WILLOW AMBULATORY MEDICATION CHARGE

## 2025-04-14 DIAGNOSIS — E11.40 TYPE 2 DIABETES MELLITUS WITH DIABETIC NEUROPATHY, WITH LONG-TERM CURRENT USE OF INSULIN: ICD-10-CM

## 2025-04-14 DIAGNOSIS — Z79.4 TYPE 2 DIABETES MELLITUS WITH DIABETIC NEUROPATHY, WITH LONG-TERM CURRENT USE OF INSULIN: ICD-10-CM

## 2025-04-14 PROCEDURE — RXMED WILLOW AMBULATORY MEDICATION CHARGE

## 2025-04-15 PROCEDURE — RXMED WILLOW AMBULATORY MEDICATION CHARGE

## 2025-04-15 RX ORDER — PEN NEEDLE, DIABETIC 30 GX3/16"
NEEDLE, DISPOSABLE MISCELLANEOUS
Qty: 100 EACH | Refills: 11 | Status: SHIPPED | OUTPATIENT
Start: 2025-04-15 | End: 2026-04-15

## 2025-04-17 ENCOUNTER — PHARMACY VISIT (OUTPATIENT)
Dept: PHARMACY | Facility: CLINIC | Age: 55
End: 2025-04-17
Payer: MEDICARE

## 2025-04-18 ENCOUNTER — PHARMACY VISIT (OUTPATIENT)
Dept: PHARMACY | Facility: CLINIC | Age: 55
End: 2025-04-18
Payer: MEDICARE

## 2025-04-21 ENCOUNTER — PATIENT MESSAGE (OUTPATIENT)
Dept: PRIMARY CARE | Facility: CLINIC | Age: 55
End: 2025-04-21
Payer: MEDICARE

## 2025-04-21 DIAGNOSIS — K58.9 IRRITABLE BOWEL SYNDROME, UNSPECIFIED TYPE: ICD-10-CM

## 2025-04-21 DIAGNOSIS — G89.29 OTHER CHRONIC PAIN: ICD-10-CM

## 2025-04-21 DIAGNOSIS — F41.9 ANXIETY: ICD-10-CM

## 2025-04-21 DIAGNOSIS — M96.1 POSTLAMINECTOMY SYNDROME, CERVICAL REGION: ICD-10-CM

## 2025-04-21 DIAGNOSIS — M96.1 CERVICAL POSTLAMINECTOMY SYNDROME: ICD-10-CM

## 2025-04-21 DIAGNOSIS — E11.40 PAINFUL DIABETIC NEUROPATHY (MULTI): ICD-10-CM

## 2025-04-23 RX ORDER — AMITRIPTYLINE HYDROCHLORIDE 50 MG/1
50 TABLET, FILM COATED ORAL NIGHTLY
Qty: 90 TABLET | Refills: 3 | Status: SHIPPED | OUTPATIENT
Start: 2025-04-23

## 2025-04-25 PROCEDURE — RXMED WILLOW AMBULATORY MEDICATION CHARGE

## 2025-04-26 ENCOUNTER — PHARMACY VISIT (OUTPATIENT)
Dept: PHARMACY | Facility: CLINIC | Age: 55
End: 2025-04-26
Payer: MEDICARE

## 2025-04-26 PROCEDURE — RXMED WILLOW AMBULATORY MEDICATION CHARGE

## 2025-04-28 ENCOUNTER — PHARMACY VISIT (OUTPATIENT)
Dept: PHARMACY | Facility: CLINIC | Age: 55
End: 2025-04-28
Payer: MEDICARE

## 2025-04-28 DIAGNOSIS — M96.1 POSTLAMINECTOMY SYNDROME, CERVICAL REGION: ICD-10-CM

## 2025-04-28 DIAGNOSIS — G89.29 OTHER CHRONIC PAIN: ICD-10-CM

## 2025-04-28 DIAGNOSIS — M96.1 CERVICAL POSTLAMINECTOMY SYNDROME: ICD-10-CM

## 2025-04-28 DIAGNOSIS — E11.40 PAINFUL DIABETIC NEUROPATHY (MULTI): ICD-10-CM

## 2025-05-05 ENCOUNTER — PHARMACY VISIT (OUTPATIENT)
Dept: PHARMACY | Facility: CLINIC | Age: 55
End: 2025-05-05
Payer: MEDICARE

## 2025-05-05 DIAGNOSIS — F41.9 ANXIETY: ICD-10-CM

## 2025-05-05 PROCEDURE — RXMED WILLOW AMBULATORY MEDICATION CHARGE

## 2025-05-05 RX ORDER — ALPRAZOLAM 1 MG/1
1 TABLET ORAL EVERY 8 HOURS PRN
Qty: 90 TABLET | Refills: 2 | Status: SHIPPED | OUTPATIENT
Start: 2025-05-05

## 2025-05-13 ENCOUNTER — OFFICE VISIT (OUTPATIENT)
Dept: PAIN MEDICINE | Facility: CLINIC | Age: 55
End: 2025-05-13
Payer: MEDICARE

## 2025-05-13 VITALS
SYSTOLIC BLOOD PRESSURE: 104 MMHG | WEIGHT: 219 LBS | BODY MASS INDEX: 28.11 KG/M2 | HEIGHT: 74 IN | DIASTOLIC BLOOD PRESSURE: 70 MMHG

## 2025-05-13 DIAGNOSIS — G89.29 OTHER CHRONIC PAIN: ICD-10-CM

## 2025-05-13 DIAGNOSIS — E11.40 PAINFUL DIABETIC NEUROPATHY (MULTI): ICD-10-CM

## 2025-05-13 DIAGNOSIS — M96.1 CERVICAL POSTLAMINECTOMY SYNDROME: ICD-10-CM

## 2025-05-13 DIAGNOSIS — M96.1 POSTLAMINECTOMY SYNDROME, CERVICAL REGION: ICD-10-CM

## 2025-05-13 DIAGNOSIS — G54.6 PHANTOM LIMB PAIN: Primary | ICD-10-CM

## 2025-05-13 PROCEDURE — 3008F BODY MASS INDEX DOCD: CPT | Performed by: ANESTHESIOLOGY

## 2025-05-13 PROCEDURE — 1036F TOBACCO NON-USER: CPT | Performed by: ANESTHESIOLOGY

## 2025-05-13 PROCEDURE — 4010F ACE/ARB THERAPY RXD/TAKEN: CPT | Performed by: ANESTHESIOLOGY

## 2025-05-13 PROCEDURE — 3078F DIAST BP <80 MM HG: CPT | Performed by: ANESTHESIOLOGY

## 2025-05-13 PROCEDURE — 99214 OFFICE O/P EST MOD 30 MIN: CPT | Performed by: ANESTHESIOLOGY

## 2025-05-13 PROCEDURE — G2211 COMPLEX E/M VISIT ADD ON: HCPCS | Performed by: ANESTHESIOLOGY

## 2025-05-13 PROCEDURE — 3044F HG A1C LEVEL LT 7.0%: CPT | Performed by: ANESTHESIOLOGY

## 2025-05-13 PROCEDURE — 3074F SYST BP LT 130 MM HG: CPT | Performed by: ANESTHESIOLOGY

## 2025-05-13 ASSESSMENT — ENCOUNTER SYMPTOMS
GASTROINTESTINAL NEGATIVE: 1
HEMATOLOGIC/LYMPHATIC NEGATIVE: 1
LEG PAIN: 1
ALLERGIC/IMMUNOLOGIC NEGATIVE: 1
NUMBNESS: 1
ENDOCRINE NEGATIVE: 1
PSYCHIATRIC NEGATIVE: 1
WEAKNESS: 1
BACK PAIN: 1
NECK PAIN: 1
CONSTITUTIONAL NEGATIVE: 1
MYALGIAS: 1
EYES NEGATIVE: 1
CARDIOVASCULAR NEGATIVE: 1
RESPIRATORY NEGATIVE: 1

## 2025-05-13 ASSESSMENT — PAIN SCALES - GENERAL
PAINLEVEL_OUTOF10: 8
PAINLEVEL_OUTOF10: 8

## 2025-05-13 ASSESSMENT — PAIN DESCRIPTION - DESCRIPTORS: DESCRIPTORS: ACHING

## 2025-05-13 ASSESSMENT — PATIENT HEALTH QUESTIONNAIRE - PHQ9
1. LITTLE INTEREST OR PLEASURE IN DOING THINGS: NOT AT ALL
SUM OF ALL RESPONSES TO PHQ9 QUESTIONS 1 AND 2: 0
2. FEELING DOWN, DEPRESSED OR HOPELESS: NOT AT ALL

## 2025-05-13 ASSESSMENT — PAIN - FUNCTIONAL ASSESSMENT: PAIN_FUNCTIONAL_ASSESSMENT: 0-10

## 2025-05-13 NOTE — PROGRESS NOTES
Subjective   Patient ID: Geoffrey Dan is a 54 y.o. male who presents for Neck Pain and Leg Pain.  Neck Pain   Associated symptoms include leg pain, numbness and weakness.   Leg Pain   Associated symptoms include numbness.   Patient here today for follow up today.  He reports his pain as a 8/10.  He has been having issues with his right prosthesis.  He has a lot of pain with it and has been working with his provider to get a better fit on it.  The prosthetic is hard to wear and it is very hot and it will make him nauseated.  He also suffers with chronic neck pain and reports that the right hand is also getting weak.  He is a right sided BKA which is now causes him a lot of new issues which is upsetting for him.  He is a right handed person  Patient also reports some numbness down the left leg.  He had a tumor and reconstruction in it already removal from the left leg which is his good leg.  He states that when he sits up in bed it will go numb.  He has not looked into a different practice at this time yet.  He wants to get his prostatic issues under control.    Current medication regimen is helpful for him.  He is able to get 4 hours of sleep consistently which is an improvement.  He is living independently.  Can complete all of his ADLs.  Reports no adverse side effects.           Pain Score:   8       Review of Systems   Constitutional: Negative.    HENT: Negative.     Eyes: Negative.    Respiratory: Negative.     Cardiovascular: Negative.    Gastrointestinal: Negative.    Endocrine: Negative.    Genitourinary: Negative.    Musculoskeletal:  Positive for back pain, myalgias and neck pain.        Left foot pain     Skin: Negative.    Allergic/Immunologic: Negative.    Neurological:  Positive for weakness and numbness.   Hematological: Negative.    Psychiatric/Behavioral: Negative.         Objective   Physical Exam  Vitals and nursing note reviewed.   Constitutional:       Appearance: Normal appearance.   HENT:       Head: Normocephalic and atraumatic.      Right Ear: Ear canal and external ear normal.      Left Ear: Ear canal and external ear normal.      Nose: Nose normal.      Mouth/Throat:      Mouth: Mucous membranes are moist.      Pharynx: Oropharynx is clear.   Eyes:      Conjunctiva/sclera: Conjunctivae normal.      Pupils: Pupils are equal, round, and reactive to light.   Cardiovascular:      Rate and Rhythm: Normal rate.   Pulmonary:      Effort: Pulmonary effort is normal. No respiratory distress.   Musculoskeletal:      Cervical back: Neck supple. Spasms and tenderness present. Pain with movement present. Decreased range of motion.      Lumbar back: Tenderness present. Normal range of motion.      Left foot: Decreased range of motion.        Legs:       Comments: RIGHT bka - WOUND       Right Lower Extremity: Right leg is amputated below knee.   Feet:      Left foot:      Skin integrity: Erythema present.   Skin:     General: Skin is warm and dry.   Neurological:      Mental Status: He is alert.      Cranial Nerves: Cranial nerves 2-12 are intact.      Sensory: Sensory deficit present.      Motor: Weakness (hand) present.      Coordination: Coordination is intact.      Gait: Gait is intact.      Deep Tendon Reflexes:      Reflex Scores:       Bicep reflexes are 1+ on the right side and 1+ on the left side.       Brachioradialis reflexes are 1+ on the right side and 1+ on the left side.  Psychiatric:         Mood and Affect: Mood normal.         Thought Content: Thought content normal.         Assessment/Plan   Problem List Items Addressed This Visit           ICD-10-CM    Painful diabetic neuropathy (Multi) E11.40    Cervical postlaminectomy syndrome M96.1     Other Visit Diagnoses         Codes      Phantom limb pain    -  Primary G54.6      Postlaminectomy syndrome, cervical region     M96.1      Other chronic pain     G89.29               I nice discussion with the patient today our plan will be as  follows.    Radiology: All available imaging was reviewed today.    Physically: Patient should continue home exercise program.    Psychologically: No issues at this time.    Medication: I will refill the patient's opioids today for 2 month.  The patient continues to see benefit and improvement in their quality of life and ability to maintain ADLs.  Patient educated about the risks of taking opioids and operating a motor vehicle.  Patient reports no adverse side effects to current medication regimen.  Current regimen does allow patient to maintain ADLs.  Patient reports no new neurologic symptoms, new pain areas, or exacerbation in pain today.  Patient reports they are happy with current treatment care path.    OARRS was reviewed and was consistent with the history.    Patient has been educated on the risks, benefits, and alternatives of controlled substances as well as the proper way to store these medications.  The patient and I discussed the nature of this medication and its side effects.  We discussed tolerance, physical dependence, psychological dependence, addiction and opioid-induced hyperalgesia.  We discussed the potential need to wean from this medication.  We discussed the availability of programs that can help with this process if necessary.  We discussed safety issues related to opioids including safe storage.  We discussed the fact that the patient should not drive an automobile or operate heavy machinery while taking this medication.  A prescription for naloxone was offered to the patient.  The patient will be re-evaluated for the need to continue opioid therapy in 60-90 days.  All previous urine drug screens and saliva drug screens were reviewed today and are compliant with the patient's prescriptive history.      Duration: Greater than 1 year.    Intervention:  No intervention at this time.        Patient given information about [employer] to help with his prosthetic fit.    Please note that  this report has been produced using speech recognition software. It may contain errors related to grammar, punctuation or spelling. Electronically signed, but not reviewed.     Tomi Miranda MD 05/13/25 2:51 PM

## 2025-05-13 NOTE — PROGRESS NOTES
MEDICATION NAME: XTAMPZA ER  STRENGTH: 18MG  LAST FILL DATE: 25  DATE LAST TAKEN: 25  QUANTITY FILLED: 40  QUANTITY REMAININ  COUNT COMPLETED BY: СЕРГЕЙ NORTON RN and ISAÍAS BOBBY RN      UDS LAST COMPLETED: 3/2025  CONTROLLED SUBSTANCES AGREEMENT LAST SIGNED: 3/2025  ORT LAST COMPLETED:2023  Modified [employer] disability form filled out annually.

## 2025-05-23 ENCOUNTER — PHARMACY VISIT (OUTPATIENT)
Dept: PHARMACY | Facility: CLINIC | Age: 55
End: 2025-05-23
Payer: MEDICARE

## 2025-05-23 PROCEDURE — RXMED WILLOW AMBULATORY MEDICATION CHARGE

## 2025-05-26 DIAGNOSIS — F51.01 PRIMARY INSOMNIA: ICD-10-CM

## 2025-05-27 PROCEDURE — RXMED WILLOW AMBULATORY MEDICATION CHARGE

## 2025-05-27 RX ORDER — TRAZODONE HYDROCHLORIDE 100 MG/1
100 TABLET ORAL NIGHTLY
Qty: 90 TABLET | Refills: 1 | Status: SHIPPED | OUTPATIENT
Start: 2025-05-27 | End: 2025-11-23

## 2025-05-29 ENCOUNTER — PHARMACY VISIT (OUTPATIENT)
Dept: PHARMACY | Facility: CLINIC | Age: 55
End: 2025-05-29
Payer: MEDICARE

## 2025-06-06 DIAGNOSIS — M48.02 CERVICAL SPINAL STENOSIS: ICD-10-CM

## 2025-06-06 DIAGNOSIS — Z79.4 TYPE 2 DIABETES MELLITUS WITH DIABETIC NEUROPATHY, WITH LONG-TERM CURRENT USE OF INSULIN: ICD-10-CM

## 2025-06-06 DIAGNOSIS — E11.40 TYPE 2 DIABETES MELLITUS WITH DIABETIC NEUROPATHY, WITH LONG-TERM CURRENT USE OF INSULIN: ICD-10-CM

## 2025-06-06 DIAGNOSIS — K58.9 IRRITABLE BOWEL SYNDROME, UNSPECIFIED TYPE: ICD-10-CM

## 2025-06-06 RX ORDER — FLUTICASONE PROPIONATE 50 MCG
1 SPRAY, SUSPENSION (ML) NASAL DAILY
Qty: 16 G | Refills: 11 | Status: SHIPPED | OUTPATIENT
Start: 2025-06-06

## 2025-06-06 RX ORDER — GABAPENTIN 300 MG/1
600 CAPSULE ORAL 3 TIMES DAILY
Qty: 540 CAPSULE | Refills: 2 | Status: SHIPPED | OUTPATIENT
Start: 2025-06-06

## 2025-06-09 DIAGNOSIS — E11.40 TYPE 2 DIABETES MELLITUS WITH DIABETIC NEUROPATHY, WITH LONG-TERM CURRENT USE OF INSULIN: ICD-10-CM

## 2025-06-09 DIAGNOSIS — Z79.4 TYPE 2 DIABETES MELLITUS WITH DIABETIC NEUROPATHY, WITH LONG-TERM CURRENT USE OF INSULIN: ICD-10-CM

## 2025-06-09 DIAGNOSIS — K58.9 IRRITABLE BOWEL SYNDROME, UNSPECIFIED TYPE: ICD-10-CM

## 2025-06-09 RX ORDER — PEN NEEDLE, DIABETIC 30 GX3/16"
1 NEEDLE, DISPOSABLE MISCELLANEOUS 4 TIMES DAILY
Qty: 100 EACH | Refills: 11 | Status: SHIPPED | OUTPATIENT
Start: 2025-06-09 | End: 2026-06-09

## 2025-06-11 PROCEDURE — RXMED WILLOW AMBULATORY MEDICATION CHARGE

## 2025-06-13 ENCOUNTER — PHARMACY VISIT (OUTPATIENT)
Dept: PHARMACY | Facility: CLINIC | Age: 55
End: 2025-06-13
Payer: MEDICARE

## 2025-06-20 ENCOUNTER — PHARMACY VISIT (OUTPATIENT)
Dept: PHARMACY | Facility: CLINIC | Age: 55
End: 2025-06-20
Payer: MEDICARE

## 2025-06-20 PROCEDURE — RXMED WILLOW AMBULATORY MEDICATION CHARGE

## 2025-06-23 DIAGNOSIS — Z79.4 TYPE 2 DIABETES MELLITUS WITH DIABETIC NEUROPATHY, WITH LONG-TERM CURRENT USE OF INSULIN: ICD-10-CM

## 2025-06-23 DIAGNOSIS — M48.02 CERVICAL SPINAL STENOSIS: ICD-10-CM

## 2025-06-23 DIAGNOSIS — E11.40 TYPE 2 DIABETES MELLITUS WITH DIABETIC NEUROPATHY, WITH LONG-TERM CURRENT USE OF INSULIN: ICD-10-CM

## 2025-06-23 PROCEDURE — RXMED WILLOW AMBULATORY MEDICATION CHARGE

## 2025-06-23 RX ORDER — METFORMIN HYDROCHLORIDE 1000 MG/1
1000 TABLET ORAL
Qty: 180 TABLET | Refills: 3 | Status: SHIPPED | OUTPATIENT
Start: 2025-06-23

## 2025-06-23 RX ORDER — INSULIN GLARGINE 100 [IU]/ML
60 INJECTION, SOLUTION SUBCUTANEOUS EVERY MORNING
Qty: 45 ML | Refills: 3 | Status: SHIPPED | OUTPATIENT
Start: 2025-06-23

## 2025-06-25 ENCOUNTER — PHARMACY VISIT (OUTPATIENT)
Dept: PHARMACY | Facility: CLINIC | Age: 55
End: 2025-06-25
Payer: MEDICARE

## 2025-07-08 DIAGNOSIS — M48.02 CERVICAL SPINAL STENOSIS: ICD-10-CM

## 2025-07-09 RX ORDER — TIZANIDINE 4 MG/1
4 TABLET ORAL EVERY 6 HOURS PRN
Qty: 120 TABLET | Refills: 0 | Status: SHIPPED | OUTPATIENT
Start: 2025-07-09 | End: 2025-08-08

## 2025-07-14 ENCOUNTER — OFFICE VISIT (OUTPATIENT)
Dept: PAIN MEDICINE | Facility: CLINIC | Age: 55
End: 2025-07-14
Payer: MEDICARE

## 2025-07-14 VITALS
DIASTOLIC BLOOD PRESSURE: 82 MMHG | WEIGHT: 219 LBS | SYSTOLIC BLOOD PRESSURE: 122 MMHG | BODY MASS INDEX: 28.11 KG/M2 | HEIGHT: 74 IN | RESPIRATION RATE: 16 BRPM

## 2025-07-14 DIAGNOSIS — M96.1 CERVICAL POSTLAMINECTOMY SYNDROME: ICD-10-CM

## 2025-07-14 DIAGNOSIS — E11.40 PAINFUL DIABETIC NEUROPATHY (MULTI): ICD-10-CM

## 2025-07-14 DIAGNOSIS — M96.1 POSTLAMINECTOMY SYNDROME, CERVICAL REGION: ICD-10-CM

## 2025-07-14 DIAGNOSIS — G54.6 PHANTOM LIMB PAIN: ICD-10-CM

## 2025-07-14 DIAGNOSIS — G89.29 OTHER CHRONIC PAIN: ICD-10-CM

## 2025-07-14 PROCEDURE — 3008F BODY MASS INDEX DOCD: CPT | Performed by: ANESTHESIOLOGY

## 2025-07-14 PROCEDURE — 3079F DIAST BP 80-89 MM HG: CPT | Performed by: ANESTHESIOLOGY

## 2025-07-14 PROCEDURE — G2211 COMPLEX E/M VISIT ADD ON: HCPCS | Performed by: ANESTHESIOLOGY

## 2025-07-14 PROCEDURE — 4010F ACE/ARB THERAPY RXD/TAKEN: CPT | Performed by: ANESTHESIOLOGY

## 2025-07-14 PROCEDURE — 3074F SYST BP LT 130 MM HG: CPT | Performed by: ANESTHESIOLOGY

## 2025-07-14 PROCEDURE — 99214 OFFICE O/P EST MOD 30 MIN: CPT | Performed by: ANESTHESIOLOGY

## 2025-07-14 PROCEDURE — 3044F HG A1C LEVEL LT 7.0%: CPT | Performed by: ANESTHESIOLOGY

## 2025-07-14 PROCEDURE — 1036F TOBACCO NON-USER: CPT | Performed by: ANESTHESIOLOGY

## 2025-07-14 ASSESSMENT — ENCOUNTER SYMPTOMS
HEMATOLOGIC/LYMPHATIC NEGATIVE: 1
NECK PAIN: 1
ALLERGIC/IMMUNOLOGIC NEGATIVE: 1
ENDOCRINE NEGATIVE: 1
PSYCHIATRIC NEGATIVE: 1
CARDIOVASCULAR NEGATIVE: 1
RESPIRATORY NEGATIVE: 1
MYALGIAS: 1
CONSTITUTIONAL NEGATIVE: 1
EYES NEGATIVE: 1
BACK PAIN: 1
NUMBNESS: 1
GASTROINTESTINAL NEGATIVE: 1
WEAKNESS: 1

## 2025-07-14 ASSESSMENT — PAIN SCALES - GENERAL
PAINLEVEL_OUTOF10: 8
PAINLEVEL_OUTOF10: 8

## 2025-07-14 ASSESSMENT — PAIN - FUNCTIONAL ASSESSMENT: PAIN_FUNCTIONAL_ASSESSMENT: 0-10

## 2025-07-14 NOTE — PROGRESS NOTES
Subjective   Patient ID: Geoffrey Dan is a 54 y.o. male who presents for PHANTOM LIMB PAIN and Neck Pain.  Neck Pain   Associated symptoms include numbness and weakness.   Patient here today for follow up and management of his multiple chronic pain conditions.  He reports that he is stable at this time.  He has to do a lot of maintenance around his trailer or he may get kicked out of his home.  He suffers with neck pain, and phantom limb pain.  He continues to try to get a better prosthetic for his leg.  Patient has had many injuries resulting in chronic damage to his neck and extremities.  He also suffers with DM and neuropathy.      He reports that the current regimen of medication allows him to complete his ADLs at this time.  He has no adverse side effects of the current medications at this time.  He states that things are stable at this time.  He reports no new pain areas today.   Pain Score:   8     Review of Systems   Constitutional: Negative.    HENT: Negative.     Eyes: Negative.    Respiratory: Negative.     Cardiovascular: Negative.    Gastrointestinal: Negative.    Endocrine: Negative.    Genitourinary: Negative.    Musculoskeletal:  Positive for back pain, myalgias and neck pain.        Left foot pain     Skin: Negative.    Allergic/Immunologic: Negative.    Neurological:  Positive for weakness and numbness.   Hematological: Negative.    Psychiatric/Behavioral: Negative.         Objective   Physical Exam  Vitals and nursing note reviewed.   Constitutional:       Appearance: Normal appearance.   HENT:      Head: Normocephalic and atraumatic.      Right Ear: Ear canal and external ear normal.      Left Ear: Ear canal and external ear normal.      Nose: Nose normal.      Mouth/Throat:      Mouth: Mucous membranes are moist.      Pharynx: Oropharynx is clear.   Eyes:      Conjunctiva/sclera: Conjunctivae normal.      Pupils: Pupils are equal, round, and reactive to light.   Cardiovascular:      Rate and  Rhythm: Normal rate.   Pulmonary:      Effort: Pulmonary effort is normal. No respiratory distress.   Musculoskeletal:      Cervical back: Neck supple. Spasms and tenderness present. Pain with movement present. Decreased range of motion.      Lumbar back: Tenderness present. Normal range of motion.      Left foot: Decreased range of motion.        Legs:       Comments: RIGHT bka - WOUND       Right Lower Extremity: Right leg is amputated below knee.   Feet:      Left foot:      Skin integrity: Erythema present.   Skin:     General: Skin is warm and dry.   Neurological:      Mental Status: He is alert.      Cranial Nerves: Cranial nerves 2-12 are intact.      Sensory: Sensory deficit present.      Motor: Weakness (hand) present.      Coordination: Coordination is intact.      Gait: Gait is intact.      Deep Tendon Reflexes:      Reflex Scores:       Bicep reflexes are 1+ on the right side and 1+ on the left side.       Brachioradialis reflexes are 1+ on the right side and 1+ on the left side.  Psychiatric:         Mood and Affect: Mood normal.         Thought Content: Thought content normal.         Assessment/Plan   Problem List Items Addressed This Visit           ICD-10-CM    Painful diabetic neuropathy (Multi) E11.40    Cervical postlaminectomy syndrome M96.1     Other Visit Diagnoses         Codes      Postlaminectomy syndrome, cervical region     M96.1      Other chronic pain     G89.29      Phantom limb pain     G54.6               I nice discussion with the patient today our plan will be as follows.    Radiology: All available imaging was reviewed today.    Physically: Patient should continue home exercise program.    Psychologically: No issues at this time.    Medication: I will refill the patient's opioids today for 2 month.  The patient continues to see benefit and improvement in their quality of life and ability to maintain ADLs.  Patient educated about the risks of taking opioids and operating a motor  vehicle.  Patient reports no adverse side effects to current medication regimen.  Current regimen does allow patient to maintain ADLs.  Patient reports no new neurologic symptoms, new pain areas, or exacerbation in pain today.  Patient reports they are happy with current treatment care path.    OARRS was reviewed and was consistent with the history.    Patient has been educated on the risks, benefits, and alternatives of controlled substances as well as the proper way to store these medications.  The patient and I discussed the nature of this medication and its side effects.  We discussed tolerance, physical dependence, psychological dependence, addiction and opioid-induced hyperalgesia.  We discussed the potential need to wean from this medication.  We discussed the availability of programs that can help with this process if necessary.  We discussed safety issues related to opioids including safe storage.  We discussed the fact that the patient should not drive an automobile or operate heavy machinery while taking this medication.  A prescription for naloxone was offered to the patient.  The patient will be re-evaluated for the need to continue opioid therapy in 60-90 days.  All previous urine drug screens and saliva drug screens were reviewed today and are compliant with the patient's prescriptive history.    Patient will start Journavx 50 mg.  We will start with the 100 mg loading dose then 50 mg tablet every 12 hours for total 14 days.    Duration: Greater than 1 year.    Intervention:  No intervention at this time.           Please note that this report has been produced using speech recognition software. It may contain errors related to grammar, punctuation or spelling. Electronically signed, but not reviewed.     Tomi Miranda MD 07/14/25 2:24 PM

## 2025-07-14 NOTE — PROGRESS NOTES
MEDICATION NAME: XRAMPZA ER  STRENGTH: 18MG  LAST FILL DATE: 6/20/25  DATE LAST TAKEN: 7/14/25  QUANTITY FILLED: 60  QUANTITY REMAINING: 15  COUNT COMPLETED BY: СЕРГЕЙ NORTON RN and LINDSAY ALVARADO LPN      UDS COMPLETED3/2025  CONTROLLED SUBSTANCES AGREEMENT SIGNED3/2025  ORT [id number]/2023  Modified [employer] disability form filled out annually.

## 2025-07-16 PROCEDURE — RXMED WILLOW AMBULATORY MEDICATION CHARGE

## 2025-07-18 ENCOUNTER — PHARMACY VISIT (OUTPATIENT)
Dept: PHARMACY | Facility: CLINIC | Age: 55
End: 2025-07-18
Payer: MEDICARE

## 2025-07-18 DIAGNOSIS — E11.40 TYPE 2 DIABETES MELLITUS WITH DIABETIC NEUROPATHY, WITH LONG-TERM CURRENT USE OF INSULIN: ICD-10-CM

## 2025-07-18 DIAGNOSIS — Z79.4 TYPE 2 DIABETES MELLITUS WITH DIABETIC NEUROPATHY, WITH LONG-TERM CURRENT USE OF INSULIN: ICD-10-CM

## 2025-07-18 DIAGNOSIS — R73.9 HYPERGLYCEMIA: ICD-10-CM

## 2025-07-18 DIAGNOSIS — E78.2 MIXED HYPERLIPIDEMIA: ICD-10-CM

## 2025-07-18 DIAGNOSIS — E55.9 VITAMIN D DEFICIENCY: ICD-10-CM

## 2025-07-18 DIAGNOSIS — Z12.5 ENCOUNTER FOR PROSTATE CANCER SCREENING: ICD-10-CM

## 2025-07-18 PROCEDURE — RXMED WILLOW AMBULATORY MEDICATION CHARGE

## 2025-07-24 ASSESSMENT — ENCOUNTER SYMPTOMS
ABDOMINAL PAIN: 0
FEVER: 0
SHORTNESS OF BREATH: 0

## 2025-07-24 NOTE — PROGRESS NOTES
[facility]: [facility]  TELEHEALTH ENCOUNTER      Geoffrey Dan is a 54 y.o. male that is presenting today for No chief complaint on file..  This is a telehealth encounter with audio technology. Patient has consented to this type of visit. Duration of encounter: 5 minutes.    Assessment/Plan   Diagnoses and all orders for this visit:  Anxiety  Long-term current use of benzodiazepine    Alprazolam refill.    She also needs a new prosthetic company, referral placed.    Asked him to get the laboratory studies done as ordered.    This patient is prescribed a controlled substance.  A controlled substance agreement has been completed and scanned into the chart.  The patient understands that they will need to be seen every 90 days and that [place] will be queried and evaluated for inconsistencies at 90 days intervals or more frequently as indicated.    Subjective   HPI  Virtual or Telephone Consent    While technically available, the patient was unable or unwilling to consent to connect via audio/video telehealth technology; therefore, I performed this visit using a real-time audio only connection between Geoffrey Dan & Chas Cordova MD.  Verbal consent was requested and obtained from Geoffrey Dan on this date, 07/24/25 for a telehealth visit and the patient's location was confirmed at the time of the visit.     This is a virtual visit to discuss chronic use of the controlled substance alprazolam.  With the medication, anxiety has been under reasonable control and there are no adverse effects noted.  Quality of life and functional status are improved because of the medication.    Review of Systems   Constitutional:  Negative for fever.   Respiratory:  Negative for shortness of breath.    Cardiovascular:  Negative for chest pain.   Gastrointestinal:  Negative for abdominal pain.   All other systems reviewed and are negative.     Objective   There were no vitals filed for this visit.  There is no  "height or weight on file to calculate BMI.  Physical Exam  Vitals (Patient-reported vitals.) reviewed.   Constitutional:       Comments: This is a virtual / telehealth encounter; unable to perform physical exam.       Diagnostic Results   Lab Results   Component Value Date    GLUCOSE 50 (L) 03/24/2025    CALCIUM 9.5 03/24/2025     03/24/2025    K 4.1 03/24/2025    CO2 30 03/24/2025    CL 98 03/24/2025    BUN 13 03/24/2025    CREATININE 0.77 03/24/2025     Lab Results   Component Value Date    ALT 16 03/24/2025    AST 19 03/24/2025    ALKPHOS 69 03/24/2025    BILITOT 0.4 03/24/2025     Lab Results   Component Value Date    WBC 5.8 03/24/2025    HGB 15.7 03/24/2025    HCT 46.4 03/24/2025    MCV 89.9 03/24/2025     03/24/2025     Lab Results   Component Value Date    CHOL 196 08/08/2024    CHOL 196 12/19/2022    CHOL 211 (H) 09/24/2020     Lab Results   Component Value Date    HDL 48.9 08/08/2024    HDL 36 (L) 12/19/2022    HDL 36 (L) 09/24/2020     Lab Results   Component Value Date    LDLCALC 107 (H) 08/08/2024    LDLCALC 81 12/19/2022    LDLCALC 144 (H) 09/24/2020     Lab Results   Component Value Date    TRIG 199 (H) 08/08/2024    TRIG 397 (H) 12/19/2022    TRIG 156 (H) 09/24/2020     No components found for: \"CHOLHDL\"  Lab Results   Component Value Date    HGBA1C 5.9 03/24/2025     Other labs not included in the list above were reviewed either before or during this encounter.    History   Medical History[1]  Surgical History[2]  Family History[3]  Social History     Socioeconomic History    Marital status:      Spouse name: Not on file    Number of children: Not on file    Years of education: Not on file    Highest education level: Not on file   Occupational History    Not on file   Tobacco Use    Smoking status: Former     Current packs/day: 0.00     Average packs/day: 0.5 packs/day for 15.1 years (7.5 ttl pk-yrs)     Types: Cigarettes     Start date: 1/9/1993     Quit date: 2/10/2008     " Years since quittin.4    Smokeless tobacco: Former     Quit date: 2/10/2008    Tobacco comments:     Quit cigarettes for good. Health reasons. Neck surgery. Made to quit.   Vaping Use    Vaping status: Never Used   Substance and Sexual Activity    Alcohol use: Never    Drug use: Yes     Types: Marijuana     Comment: medical marijuana card    Sexual activity: Defer   Other Topics Concern    Not on file   Social History Narrative    Not on file     Social Drivers of Health     Financial Resource Strain: Not on File (2022)    Received from [employer]    Financial Resource Strain     Financial Resource Strain: 0   Food Insecurity: Not on File (2024)    Received from [employer]    Food Insecurity     Food: 0   Transportation Needs: Not on File (2022)    Received from [employer]    Transportation Needs     Transportation: 0   Physical Activity: Not on File (2021)    Received from [employer]    Physical Activity     Physical Activity: 0   Stress: Not on File (2022)    Received from [employer]    Stress     Stress: 0   Social Connections: Not on File (2022)    Received from [employer]    Social Connections     Connectedness: 0   Intimate Partner Violence: Not on file   Housing Stability: Not on File (2022)    Received from [employer]    Housing Stability     Housin     Allergies[4]  Medications Ordered Prior to Encounter[5]  Immunization History   Administered Date(s) Administered    Flu vaccine (IIV4), preservative free *Check age/dose* 10/18/2013    Flu vaccine, trivalent, preservative free, age 6 months and greater (Fluarix/Fluzone/Flulaval) 10/03/2016    Influenza, Unspecified 2009, 10/26/2010, 2012    Influenza, seasonal, injectable 10/16/2017    Pneumococcal polysaccharide vaccine, 23-valent, age 2 years and older (PNEUMOVAX 23) 2009    Tdap vaccine, age 7 year and older (BOOSTRIX, ADACEL) 2019     Patient's medical history was reviewed and updated either before or during this  "encounter.       Chas Cordova MD         [1]   Past Medical History:  Diagnosis Date    Cervical disc disorder 2000    Chronic pain disorder 2000    Extremity pain 2000    Fibromyalgia, primary 2000    Fractures 2000    Headache, tension-type 2000    Joint pain 2000    Low back pain 2000    Lumbosacral disc disease 2000    Migraine 2000    Neck pain 2000    Neuropathy in diabetes (Multi) 2008    Peripheral neuropathy 2008    Personal history of other endocrine, nutritional and metabolic disease     History of diabetes mellitus    Spinal stenosis 2000   [2]   Past Surgical History:  Procedure Laterality Date    CT ANGIO AORTA AND BILATERAL ILIOFEMORAL RUN OFF INCLUDING WITHOUT CONTRAST IF PERFORMED  12/6/2017    CT AORTA AND BILATERAL ILIOFEMORAL RUNOFF ANGIOGRAM W AND/OR WO IV CONTRAST 12/6/2017 [facility] INPATIENT LEGACY    NECK SURGERY  2008    ORTHOPEDIC SURGERY  2008    OTHER SURGICAL HISTORY  05/29/2013    Leg Repair    OTHER SURGICAL HISTORY  01/30/2018    Amputation Of Leg Below Knee    SPINAL FUSION  2008, than again 2009   [3]   Family History  Adopted: Yes   [4]   Allergies  Allergen Reactions    Atorvastatin Hives, Shortness of breath and Other     Facial edema   Resp distress    Fenofibrate Nanocrystallized Shortness of breath and Other     Facial edema   Resp distress    Insulin Glargine Other and Shortness of breath     Basaglar allery; ok with Lantus    Moxifloxacin Shortness of breath and Rash     Racing heart  splotches    Tramadol Shortness of breath    Other Other     \"All Antihypertensives\"  Throat swelling  Passed out      Pravastatin Unknown    Apixaban Rash     Racing heart   [5]   Current Outpatient Medications on File Prior to Visit   Medication Sig Dispense Refill    albuterol (ProAir HFA) 90 mcg/actuation inhaler Inhale 2 puffs every 4 hours if needed for wheezing or shortness of breath. 8.5 g 11    ALPRAZolam (Xanax) 1 mg tablet Take 1 tablet (1 mg) by mouth every 8 hours if needed for " anxiety. 90 tablet 2    amitriptyline (Elavil) 50 mg tablet Take 1 tablet (50 mg) by mouth once daily at bedtime. 90 tablet 3    aspirin 81 mg chewable tablet Chew and swallow 1 tablet (81 mg) once daily.      blood sugar diagnostic (Accu-Chek Guide test strips) strip USE AS DIRECTED FOR TESTING BLOOD GLUCOSE TWICE DAILY      fluticasone (Flonase) 50 mcg/actuation nasal spray Administer 1 spray into each nostril once daily. shake liquid 16 g 11    gabapentin (Neurontin) 300 mg capsule Take 2 capsules (600 mg) by mouth 3 times a day. 540 capsule 2    insulin aspart (NovoLOG Flexpen U-100 Insulin) 100 unit/mL (3 mL) pen Inject 20 Units under the skin 3 times a day before meals 15 mL 11    insulin glargine (Lantus Solostar U-100 Insulin) 100 unit/mL (3 mL) pen Inject 60 Units under the skin once daily in the morning. 45 mL 3    insulin glargine (Lantus Solostar U-100 Insulin) 100 unit/mL (3 mL) pen Inject 60 Units under the skin once daily in the morning. 45 mL 3    lidocaine (Lidoderm) 5 % patch Place 1 patch on the skin once daily. remove after 12 hours      linaCLOtide (Linzess) 145 mcg capsule Take 1 capsule (145 mcg) by mouth once daily in the morning. Take before meals. 30 capsule 11    lisinopril 10 mg tablet Take 1 tablet (10 mg) by mouth once daily. 30 tablet 11    metFORMIN (Glucophage) 1,000 mg tablet Take 1 tablet (1,000 mg) by mouth 2 times daily (morning and late afternoon). 180 tablet 3    metFORMIN (Glucophage) 1,000 mg tablet TAKE 1 TABLET BY MOUTH TWICE DAILY WITH MEALS 180 tablet 3    naloxone (Narcan) 4 mg/0.1 mL nasal spray Administer 1 spray (4 mg) into affected nostril(s) if needed for opioid reversal. May repeat every 2-3 minutes if needed, alternating nostrils, until medical assistance becomes available. 2 each 0    nitroglycerin (Nitrodur) 0.2 mg/hr patch Place 1 patch over 12 hours on the skin once daily. Remove patch every night for at least 10 hours. 30 patch 11    NovoLOG Flexpen U-100  "Insulin 100 unit/mL (3 mL) pen INJECT 20 UNITS UNDER THE SKIN THREE TIMES DAILY UP TO 60 UNITS PER DAY 54 mL 3    [START ON 8/15/2025] oxyCODONE myristate (Xtampza ER) 18 mg 12 hr abuse-deterrent capsule Take 1 capsule (18 mg) by mouth 2 times a day. Must be taken with food. Do not chew or crush Do not fill before August 15, 2025. 60 capsule 0    oxyCODONE myristate (Xtampza ER) 18 mg 12 hr abuse-deterrent capsule Take 1 capsule (18 mg) by mouth 2 times a day. Must be taken with food. Do not chew or crush Do not fill before July 18, 2025. 60 capsule 0    pen needle 1/2\" (BD Ultra-Fine Orig Pen Needle) 29G X 12mm needle every 6 hours. 1 subcutaneously four times a day      pen needle, diabetic (BD Ultra-Fine Delilah Pen Needle) 32 gauge x 5/32\" needle Use 1 pen needle 4 times a day. 100 each 11    suzetrigine 50 mg tablet Take 100 mg loading dose then 50 mg q 12 hrs 29 tablet 0    tiZANidine (Zanaflex) 4 mg tablet Take 1 tablet (4 mg) by mouth every 6 hours if needed for muscle spasms. 120 tablet 0    traZODone (Desyrel) 100 mg tablet Take 1 tablet (100 mg) by mouth once daily at bedtime. 90 tablet 1     No current facility-administered medications on file prior to visit.     "

## 2025-07-25 ENCOUNTER — TELEMEDICINE (OUTPATIENT)
Dept: PRIMARY CARE | Facility: CLINIC | Age: 55
End: 2025-07-25
Payer: MEDICARE

## 2025-07-25 VITALS — HEIGHT: 74 IN | BODY MASS INDEX: 28.12 KG/M2

## 2025-07-25 DIAGNOSIS — F41.9 ANXIETY: Primary | ICD-10-CM

## 2025-07-25 DIAGNOSIS — Z89.511 HX OF BKA, RIGHT (MULTI): ICD-10-CM

## 2025-07-25 DIAGNOSIS — Z87.891 HISTORY OF NICOTINE USE: ICD-10-CM

## 2025-07-25 DIAGNOSIS — J40 BRONCHITIS: ICD-10-CM

## 2025-07-25 DIAGNOSIS — Z79.899 LONG-TERM CURRENT USE OF BENZODIAZEPINE: ICD-10-CM

## 2025-07-25 DIAGNOSIS — I10 PRIMARY HYPERTENSION: ICD-10-CM

## 2025-07-25 PROCEDURE — 3044F HG A1C LEVEL LT 7.0%: CPT | Performed by: INTERNAL MEDICINE

## 2025-07-25 PROCEDURE — 4010F ACE/ARB THERAPY RXD/TAKEN: CPT | Performed by: INTERNAL MEDICINE

## 2025-07-25 PROCEDURE — 99212 OFFICE O/P EST SF 10 MIN: CPT | Performed by: INTERNAL MEDICINE

## 2025-07-25 RX ORDER — ALPRAZOLAM 1 MG/1
1 TABLET ORAL EVERY 8 HOURS PRN
Qty: 90 TABLET | Refills: 2 | Status: SHIPPED | OUTPATIENT
Start: 2025-07-25

## 2025-07-25 RX ORDER — ALBUTEROL SULFATE 90 UG/1
2 INHALANT RESPIRATORY (INHALATION) EVERY 4 HOURS PRN
Qty: 8.5 G | Refills: 11 | Status: SHIPPED | OUTPATIENT
Start: 2025-07-25 | End: 2026-07-25

## 2025-07-25 RX ORDER — LISINOPRIL 20 MG/1
20 TABLET ORAL DAILY
Qty: 90 TABLET | Refills: 3 | Status: SHIPPED | OUTPATIENT
Start: 2025-07-25 | End: 2026-07-25

## 2025-07-25 ASSESSMENT — LIFESTYLE VARIABLES
HAS A RELATIVE, FRIEND, DOCTOR, OR ANOTHER HEALTH PROFESSIONAL EXPRESSED CONCERN ABOUT YOUR DRINKING OR SUGGESTED YOU CUT DOWN: NO
AUDIT TOTAL SCORE: 0
AUDIT-C TOTAL SCORE: 0
HOW OFTEN DO YOU HAVE A DRINK CONTAINING ALCOHOL: NEVER
SKIP TO QUESTIONS 9-10: 1
HOW OFTEN DURING THE LAST YEAR HAVE YOU HAD A FEELING OF GUILT OR REMORSE AFTER DRINKING: NEVER
HOW OFTEN DO YOU HAVE SIX OR MORE DRINKS ON ONE OCCASION: NEVER
HOW OFTEN DURING THE LAST YEAR HAVE YOU BEEN UNABLE TO REMEMBER WHAT HAPPENED THE NIGHT BEFORE BECAUSE YOU HAD BEEN DRINKING: NEVER
HOW OFTEN DURING THE LAST YEAR HAVE YOU FOUND THAT YOU WERE NOT ABLE TO STOP DRINKING ONCE YOU HAD STARTED: NEVER
HOW OFTEN DURING THE LAST YEAR HAVE YOU NEEDED AN ALCOHOLIC DRINK FIRST THING IN THE MORNING TO GET YOURSELF GOING AFTER A NIGHT OF HEAVY DRINKING: NEVER
HAVE YOU OR SOMEONE ELSE BEEN INJURED AS A RESULT OF YOUR DRINKING: NO
HOW MANY STANDARD DRINKS CONTAINING ALCOHOL DO YOU HAVE ON A TYPICAL DAY: PATIENT DOES NOT DRINK
HOW OFTEN DURING THE LAST YEAR HAVE YOU FAILED TO DO WHAT WAS NORMALLY EXPECTED FROM YOU BECAUSE OF DRINKING: NEVER

## 2025-07-25 ASSESSMENT — ENCOUNTER SYMPTOMS
DEPRESSION: 0
OCCASIONAL FEELINGS OF UNSTEADINESS: 1
LOSS OF SENSATION IN FEET: 0

## 2025-07-25 ASSESSMENT — PAIN SCALES - GENERAL: PAINLEVEL_OUTOF10: 9

## 2025-08-01 ENCOUNTER — PHARMACY VISIT (OUTPATIENT)
Dept: PHARMACY | Facility: CLINIC | Age: 55
End: 2025-08-01
Payer: MEDICARE

## 2025-08-01 DIAGNOSIS — E11.40 PAINFUL DIABETIC NEUROPATHY (MULTI): ICD-10-CM

## 2025-08-01 DIAGNOSIS — G54.6 PHANTOM LIMB PAIN: ICD-10-CM

## 2025-08-01 DIAGNOSIS — M96.1 CERVICAL POSTLAMINECTOMY SYNDROME: ICD-10-CM

## 2025-08-01 DIAGNOSIS — G89.29 OTHER CHRONIC PAIN: ICD-10-CM

## 2025-08-01 DIAGNOSIS — M96.1 POSTLAMINECTOMY SYNDROME, CERVICAL REGION: ICD-10-CM

## 2025-08-01 PROCEDURE — RXMED WILLOW AMBULATORY MEDICATION CHARGE

## 2025-08-01 RX ORDER — SUZETRIGINE 50 MG/1
TABLET, FILM COATED ORAL
Qty: 29 TABLET | Refills: 0 | Status: SHIPPED | OUTPATIENT
Start: 2025-08-01

## 2025-08-06 DIAGNOSIS — Z79.4 TYPE 2 DIABETES MELLITUS WITH DIABETIC NEUROPATHY, WITH LONG-TERM CURRENT USE OF INSULIN: ICD-10-CM

## 2025-08-06 DIAGNOSIS — E11.40 TYPE 2 DIABETES MELLITUS WITH DIABETIC NEUROPATHY, WITH LONG-TERM CURRENT USE OF INSULIN: ICD-10-CM

## 2025-08-06 DIAGNOSIS — F41.9 ANXIETY: ICD-10-CM

## 2025-08-06 DIAGNOSIS — M48.02 CERVICAL SPINAL STENOSIS: ICD-10-CM

## 2025-08-06 RX ORDER — METFORMIN HYDROCHLORIDE 1000 MG/1
1000 TABLET ORAL
Qty: 180 TABLET | Refills: 3 | Status: SHIPPED | OUTPATIENT
Start: 2025-08-06

## 2025-08-06 RX ORDER — PEN NEEDLE, DIABETIC 30 GX3/16"
1 NEEDLE, DISPOSABLE MISCELLANEOUS 4 TIMES DAILY
Qty: 100 EACH | Refills: 11 | Status: SHIPPED | OUTPATIENT
Start: 2025-08-06 | End: 2026-08-06

## 2025-08-06 RX ORDER — ALPRAZOLAM 1 MG/1
1 TABLET ORAL EVERY 8 HOURS PRN
Qty: 90 TABLET | Refills: 2 | Status: SHIPPED | OUTPATIENT
Start: 2025-08-06

## 2025-08-06 RX ORDER — INSULIN GLARGINE 100 [IU]/ML
60 INJECTION, SOLUTION SUBCUTANEOUS EVERY MORNING
Qty: 45 ML | Refills: 3 | Status: SHIPPED | OUTPATIENT
Start: 2025-08-06

## 2025-08-07 PROCEDURE — RXMED WILLOW AMBULATORY MEDICATION CHARGE

## 2025-08-11 ENCOUNTER — PHARMACY VISIT (OUTPATIENT)
Dept: PHARMACY | Facility: CLINIC | Age: 55
End: 2025-08-11
Payer: MEDICARE

## 2025-08-15 ENCOUNTER — PHARMACY VISIT (OUTPATIENT)
Dept: PHARMACY | Facility: CLINIC | Age: 55
End: 2025-08-15
Payer: MEDICARE

## 2025-08-15 PROCEDURE — RXMED WILLOW AMBULATORY MEDICATION CHARGE

## 2025-08-24 DIAGNOSIS — M96.1 POSTLAMINECTOMY SYNDROME, CERVICAL REGION: ICD-10-CM

## 2025-08-24 DIAGNOSIS — G54.6 PHANTOM LIMB PAIN: ICD-10-CM

## 2025-08-24 DIAGNOSIS — E11.40 PAINFUL DIABETIC NEUROPATHY (MULTI): ICD-10-CM

## 2025-08-24 DIAGNOSIS — M96.1 CERVICAL POSTLAMINECTOMY SYNDROME: ICD-10-CM

## 2025-08-24 DIAGNOSIS — G89.29 OTHER CHRONIC PAIN: ICD-10-CM

## 2025-08-25 PROCEDURE — RXMED WILLOW AMBULATORY MEDICATION CHARGE

## 2025-08-25 RX ORDER — SUZETRIGINE 50 MG/1
TABLET, FILM COATED ORAL
Qty: 30 TABLET | Refills: 0 | Status: SHIPPED | OUTPATIENT
Start: 2025-08-25

## 2025-08-26 ENCOUNTER — PHARMACY VISIT (OUTPATIENT)
Dept: PHARMACY | Facility: CLINIC | Age: 55
End: 2025-08-26
Payer: MEDICARE

## 2025-08-27 PROCEDURE — RXMED WILLOW AMBULATORY MEDICATION CHARGE

## 2025-08-28 ENCOUNTER — PHARMACY VISIT (OUTPATIENT)
Dept: PHARMACY | Facility: CLINIC | Age: 55
End: 2025-08-28
Payer: MEDICARE

## 2025-09-02 DIAGNOSIS — E11.40 TYPE 2 DIABETES MELLITUS WITH DIABETIC NEUROPATHY, WITH LONG-TERM CURRENT USE OF INSULIN: ICD-10-CM

## 2025-09-02 DIAGNOSIS — Z79.4 TYPE 2 DIABETES MELLITUS WITH DIABETIC NEUROPATHY, WITH LONG-TERM CURRENT USE OF INSULIN: ICD-10-CM

## 2025-09-02 RX ORDER — INSULIN ASPART 100 [IU]/ML
20 INJECTION, SOLUTION INTRAVENOUS; SUBCUTANEOUS
Qty: 15 ML | Refills: 11 | Status: SHIPPED | OUTPATIENT
Start: 2025-09-02 | End: 2026-09-02

## 2025-09-03 DIAGNOSIS — Z79.4 TYPE 2 DIABETES MELLITUS WITH DIABETIC NEUROPATHY, WITH LONG-TERM CURRENT USE OF INSULIN: ICD-10-CM

## 2025-09-03 DIAGNOSIS — E11.40 TYPE 2 DIABETES MELLITUS WITH DIABETIC NEUROPATHY, WITH LONG-TERM CURRENT USE OF INSULIN: ICD-10-CM

## 2025-09-03 PROCEDURE — RXMED WILLOW AMBULATORY MEDICATION CHARGE

## 2025-09-03 RX ORDER — INSULIN ASPART 100 [IU]/ML
20 INJECTION, SOLUTION INTRAVENOUS; SUBCUTANEOUS
Qty: 45 ML | Refills: 3 | Status: SHIPPED | OUTPATIENT
Start: 2025-09-03

## 2025-09-06 ENCOUNTER — PHARMACY VISIT (OUTPATIENT)
Dept: PHARMACY | Facility: CLINIC | Age: 55
End: 2025-09-06
Payer: MEDICARE